# Patient Record
Sex: FEMALE | Race: WHITE
[De-identification: names, ages, dates, MRNs, and addresses within clinical notes are randomized per-mention and may not be internally consistent; named-entity substitution may affect disease eponyms.]

---

## 2018-02-13 ENCOUNTER — HOSPITAL ENCOUNTER (OUTPATIENT)
Dept: HOSPITAL 96 - M.CRD | Age: 73
End: 2018-02-13
Attending: INTERNAL MEDICINE
Payer: MEDICARE

## 2018-02-13 DIAGNOSIS — I08.3: Primary | ICD-10-CM

## 2018-02-13 NOTE — 2DMMODE
[place] [address]
Phone:  [phone] 2 D/M-MODE ECHOCARDIOGRAM     
_______________________________________________________________________________
 
Name:         COGAN,JOAN S                  Room:                     REG CLI
M.R.#:    [id number]     Account #:     [id number]  
Admission:    18    Attend Phys:   Jass Emanuel,
Discharge:                Date of Birth: [birth year]  
Date of Service: 18 1647  Report #:      [id number]
        [id number]
_______________________________________________________________________________
THIS REPORT FOR:  //name//                      
 
 
--------------- APPROVED REPORT --------------
 
 
Study performed:  2018 09:38:49
 
EXAM: Comprehensive 2D, Doppler, and color-flow 
Echocardiogram 
Patient Location: Out-Patient   
      Status:  routine
 
      BSA:         1.63
HR: 66 bpm BP:          168/82 mmHg 
 
Other Information 
Study Quality: Good
 
Indications
Aortic Valve Disease
 
2D Dimensions
   LVEF(%):  51.08 (&gt;50%)
IVSd:  19.31 (7-11mm) LVOT Diam:  20.13 (18-24mm) 
LVDd:  32.64 mm  
PWd:  9.66 (7-11mm) Ascending Ao:  29.03 (22-36mm)
LVDs:  24.40 (25-40mm) 
Aortic Root:  25.34 mm 
   Lopez's LVEF:  51.08 %
 
Volumes
Left Atrial Volume (Systole) 
    LA ESV Index:  25.50 mL/m2
 
Aortic Valve
AoV Peak Reyes.:  3.81 m/s 
AO Peak Gr.:  58.04 mmHg LVOT Max P.02 mmHg
AO Mean Gr.:  35.25 mmHg LVOT Mean P.53 mmHg
    LVOT Max V:  1.12 m/s
AO V2 VTI:  94.41 cm  LVOT Mean V:  0.74 m/s
ANDREA (VTI):  0.86 cm2  LVOT V1 VTI:  25.62 cm
AI [place]:  2.29 m/s2  
AI PHT:  554.14 ms  
 
Mitral Valve
 
 
[place] [address]
Phone:  [phone]                     2 D/M-MODE ECHOCARDIOGRAM     
_______________________________________________________________________________
 
Name:         COGAN,JOAN S                  Room:                     REG CLI
M.R.#:    [id number]     Account #:     [id number]  
Admission:    18    Attend Phys:   Jass Emanuel,
Discharge:                Date of Birth: [birth year]  
Date of Service: 18 1647  Report #:      [id number]
        [id number]
_______________________________________________________________________________
MV Peak Gr.:  7.90 mmHg  
MV Mean Gr.:  3.31 mmHg  E/A Ratio:  0.81
    MV Decel. Time:  296.29 ms
MV E Max Reyes.:  1.03 m/s 
MV PHT:  85.92 ms  
MVA (PHT):  2.56 cm2  
 
TDI
E/Lateral E':  17.17 E/Medial E':  25.75
   Medial E' Reyes.:  0.04 m/s
   Lateral E' Reyes.:  0.06 m/s
 
Pulmonary Valve
PV Peak Reyes.:  1.25 m/s PV Peak Gr.:  6.22 mmHg
 
Tricuspid Valve
TR Peak Gr.:  37.55 mmHg RVSP:  42.55 mmHg
 
Left Ventricle
The left ventricle is normal size. There is normal LV segmental wall 
motion. Moderate concentric left ventricular hypertrophy. Left 
ventricular systolic function is normal. The left ventricular 
ejection fraction is within the normal range. LVEF is 60%. Grade I - 
abnormal relaxation pattern.
 
Right Ventricle
The right ventricle is normal size. The right ventricular systolic 
function is normal.
 
Atria
The left atrium size is normal. The right atrium size is 
normal.
 
Aortic Valve
Aortic valve is moderately calcified. Mild to moderate aortic 
regurgitation. Moderate to severe aortic stenosis.
 
Mitral Valve
Moderate mitral annular calcification. Mild mitral regurgitation. No 
significant mitral valve stenosis.
 
Tricuspid Valve
The tricuspid valve is normal in structure. Mild to moderate 
tricuspid regurgitation. The RVSP is _42.5______ mmHg.
 
Pulmonic Valve
 
 
[place] [address]
Phone:  [phone]                     2 D/M-MODE ECHOCARDIOGRAM     
_______________________________________________________________________________
 
Name:         COGAN,JOAN S                  Room:                     REG CLI
M.R.#:    [id number]     Account #:     [id number]  
Admission:    18    Attend Phys:   Jass Emanuel,
Discharge:                Date of Birth: [birth year]  
Date of Service: 18 1647  Report #:      [id number]
        [id number]
_______________________________________________________________________________
The pulmonary valve is normal in structure. Mild to moderate pulmonic 
regurgitation.
 
Great Vessels
The aortic root is normal in size. IVC is normal in size and 
collapses with &gt;50% inspiration
 
Pericardium
There is no pericardial effusion.
 
&lt;Conclusion&gt;
The left ventricle is normal size.
Moderate concentric left ventricular hypertrophy.
Left ventricular systolic function is normal.
The left ventricular ejection fraction is within the normal 
range.
LVEF is 60%.
Grade I - abnormal relaxation pattern.
The right ventricle is normal size.
The left atrium size is normal.
Aortic valve is moderately calcified.
Mild to moderate aortic regurgitation.
Moderate to severe aortic stenosis.
Moderate mitral annular calcification.
Mild mitral regurgitation.
No significant mitral valve stenosis.
Mild to moderate tricuspid regurgitation.
The RVSP is _42.5______ mmHg.
IVC is normal in size and collapses with &gt;50% inspiration
There is no pericardial effusion.
There is normal LV segmental wall motion.
 
 
 
 
 
 
 
 
 
 
 
 
 
  <ELECTRONICALLY SIGNED>
                                           By: Subhash Pichardo MD, FACC     
  18
D: 18   _____________________________________
T: 18   Subhash Pichardo MD, FACC       /INF

## 2019-02-04 ENCOUNTER — HOSPITAL ENCOUNTER (OUTPATIENT)
Dept: HOSPITAL 96 - M.CRD | Age: 74
End: 2019-02-04
Attending: INTERNAL MEDICINE
Payer: MEDICARE

## 2019-02-04 DIAGNOSIS — I08.0: Primary | ICD-10-CM

## 2019-02-04 NOTE — 2DMMODE
[place] [address]
Phone:  [phone] 2 D/M-MODE ECHOCARDIOGRAM     
_______________________________________________________________________________
 
Name:         COGAN,JOAN S                  Room:                     REG CLI
M.R.#:    [id number]     Account #:     [id number]  
Admission:    19    Attend Phys:   Jass Emanuel,
Discharge:                Date of Birth: [birth year]  
Date of Service: 19 1121  Report #:      [id number]
        [id number]
_______________________________________________________________________________
THIS REPORT FOR:  //name//                      
 
 
--------------- APPROVED REPORT --------------
 
 
Study performed:  2019 08:17:26
 
EXAM: Comprehensive 2D, Doppler, and color-flow Echocardiogram 
Patient Location: Out-Patient   
 
      BSA:         1.66
HR: 63 bpm BP:          130/50 mmHg 
 
Other Information 
Study Quality: Good
 
Indications
Aortic Valve Disease
 
2D Dimensions
IVSd:  14.85 (7-11mm) LVOT Diam:  20.53 (18-24mm) 
LVDd:  43.14 mm  
PWd:  10.18 (7-11mm) Ascending Ao:  28.79 (22-36mm)
LVDs:  26.54 (25-40mm) 
Aortic Root:  24.19 mm 
 
Volumes
Left Atrial Volume (Systole) 
    LA ESV Index:  21.00 mL/m2
 
Aortic Valve
AoV Peak Reyes.:  4.35 m/s 
AO Peak Gr.:  75.63 mmHg LVOT Max PG:  3.99 mmHg
AO Mean Gr.:  47.97 mmHg LVOT Mean P.19 mmHg
    LVOT Max V:  1.00 m/s
AO V2 VTI:  120.08 cm  LVOT Mean V:  0.70 m/s
ANDREA (VTI):  0.70 cm2  LVOT V1 VTI:  25.34 cm
AI [place]:  2.64 m/s2  
AI PHT:  525.65 ms  
 
Mitral Valve
MV Peak Gr.:  6.46 mmHg  
MV Mean Gr.:  3.08 mmHg  E/A Ratio:  0.76
    MV Decel. Time:  453.76 ms
MV E Max Reyes.:  0.89 m/s 
 
 
[place] [address]
Phone:  [phone]                     2 D/M-MODE ECHOCARDIOGRAM     
_______________________________________________________________________________
 
Name:         COGAN,JOAN S                  Room:                     REG CLI
M.R.#:    [id number]     Account #:     [id number]  
Admission:    19    Attend Phys:   Jass Emanuel,
Discharge:                Date of Birth: [birth year]  
Date of Service: 19 1121  Report #:      [id number]
        [id number]
_______________________________________________________________________________
MV PHT:  131.59 ms  
MVA (PHT):  1.67 cm2  
 
TDI
E/Lateral E':  12.71 E/Medial E':  17.80
   Medial E' Reyes.:  0.05 m/s
   Lateral E' Reyes.:  0.07 m/s
 
Pulmonary Valve
PV Peak Reyes.:  1.12 m/s PV Peak Gr.:  4.98 mmHg
 
Tricuspid Valve
    RAP Estimate:  5.00 mmHg
TR Peak Gr.:  27.14 mmHg RVSP:  32.14 mmHg
    PA Pressure:  32.14 mmHg
 
Left Ventricle
The left ventricle is normal size. There is normal LV segmental wall motion.
Moderate 
concentric left ventricular hypertrophy. Left ventricular systolic function is
normal. 
The left ventricular ejection fraction is within the normal range. LVEF is
55-60%. Grade 
I - abnormal relaxation pattern.
 
Right Ventricle
The right ventricle is normal size. The right ventricular systolic function is 
normal.
 
Atria
The left atrium size is normal. The right atrium size is normal.
 
Aortic Valve
Aortic valve is moderately calcified. Mild to moderate aortic regurgitation.
Severe 
aortic stenosis.
 
Mitral Valve
Mild mitral annular calcification. Mild mitral regurgitation. No evidence of
mitral valve 
stenosis.
 
Tricuspid Valve
The tricuspid valve is normal in structure. Mild tricuspid regurgitation.
estimated pa 
pressure 35 mm Hg
 
 
[place] [address]
Phone:  [phone]                     2 D/M-MODE ECHOCARDIOGRAM     
_______________________________________________________________________________
 
Name:         COGAN,JOAN S                  Room:                     REG CLI
M.R.#:    [id number]     Account #:     [id number]  
Admission:    19    Attend Phys:   Jass Emanuel,
Discharge:                Date of Birth: [birth year]  
Date of Service: 19 1121  Report #:      [id number]
        [id number]
_______________________________________________________________________________
 
Pulmonic Valve
The pulmonary valve is normal in structure. Moderate pulmonic regurgitation.
 
Great Vessels
The aortic root is normal in size. IVC is normal in size and collapses >50% with
inspiration.
 
Pericardium
There is no pericardial effusion.
 
<Conclusion>
Moderate concentric left ventricular hypertrophy.
LVEF is 55-60%.
Severe aortic stenosis.
Mild to moderate aortic regurgitation.
Mild mitral regurgitation.
 
 
 
 
 
 
 
 
 
 
 
 
 
 
 
 
 
 
 
 
 
 
 
 
 
 
 
  <ELECTRONICALLY SIGNED>
                                           By: Roosevelt [place] MD, [facility]      
  19     1121
D: 19 112   _____________________________________
T: 19 112   Roosevelt [place] MD, FACC        /INF

## 2019-11-06 ENCOUNTER — HOSPITAL ENCOUNTER (INPATIENT)
Dept: HOSPITAL 96 - M.ERS | Age: 74
LOS: 3 days | Discharge: HOME | DRG: 281 | End: 2019-11-09
Attending: INTERNAL MEDICINE | Admitting: INTERNAL MEDICINE
Payer: MEDICARE

## 2019-11-06 VITALS — SYSTOLIC BLOOD PRESSURE: 143 MMHG | DIASTOLIC BLOOD PRESSURE: 54 MMHG

## 2019-11-06 VITALS — DIASTOLIC BLOOD PRESSURE: 60 MMHG | SYSTOLIC BLOOD PRESSURE: 134 MMHG

## 2019-11-06 VITALS — SYSTOLIC BLOOD PRESSURE: 165 MMHG | DIASTOLIC BLOOD PRESSURE: 95 MMHG

## 2019-11-06 VITALS — WEIGHT: 145.38 LBS | HEIGHT: 60.98 IN | BODY MASS INDEX: 27.45 KG/M2

## 2019-11-06 DIAGNOSIS — I21.4: Primary | ICD-10-CM

## 2019-11-06 DIAGNOSIS — Z82.49: ICD-10-CM

## 2019-11-06 DIAGNOSIS — Z88.1: ICD-10-CM

## 2019-11-06 DIAGNOSIS — I48.91: ICD-10-CM

## 2019-11-06 DIAGNOSIS — Z88.6: ICD-10-CM

## 2019-11-06 DIAGNOSIS — Z88.8: ICD-10-CM

## 2019-11-06 DIAGNOSIS — E78.5: ICD-10-CM

## 2019-11-06 DIAGNOSIS — D68.69: ICD-10-CM

## 2019-11-06 DIAGNOSIS — I05.2: ICD-10-CM

## 2019-11-06 DIAGNOSIS — I10: ICD-10-CM

## 2019-11-06 DIAGNOSIS — Z96.653: ICD-10-CM

## 2019-11-06 DIAGNOSIS — Z79.82: ICD-10-CM

## 2019-11-06 DIAGNOSIS — I25.10: ICD-10-CM

## 2019-11-06 DIAGNOSIS — Z79.899: ICD-10-CM

## 2019-11-06 DIAGNOSIS — Z90.710: ICD-10-CM

## 2019-11-06 DIAGNOSIS — Z88.0: ICD-10-CM

## 2019-11-06 LAB
ABSOLUTE BASOPHILS: 0 THOU/UL (ref 0–0.2)
ABSOLUTE EOSINOPHILS: 0.1 THOU/UL (ref 0–0.7)
ABSOLUTE MONOCYTES: 0.4 THOU/UL (ref 0–1.2)
ALBUMIN SERPL-MCNC: 4 G/DL (ref 3.4–5)
ALP SERPL-CCNC: 67 U/L (ref 46–116)
ALT SERPL-CCNC: 28 U/L (ref 30–65)
ANION GAP SERPL CALC-SCNC: 8 MMOL/L (ref 7–16)
APTT BLD: 28.9 SECONDS (ref 25–31.3)
AST SERPL-CCNC: 18 U/L (ref 15–37)
BASOPHILS NFR BLD AUTO: 0.5 %
BILIRUB SERPL-MCNC: 0.5 MG/DL
BUN SERPL-MCNC: 35 MG/DL (ref 7–18)
CALCIUM SERPL-MCNC: 9.7 MG/DL (ref 8.5–10.1)
CHLORIDE SERPL-SCNC: 101 MMOL/L (ref 98–107)
CK-MB MASS: 3.1 NG/ML
CO2 SERPL-SCNC: 31 MMOL/L (ref 21–32)
CREAT SERPL-MCNC: 0.9 MG/DL (ref 0.6–1.3)
EOSINOPHIL NFR BLD: 1.2 %
GLUCOSE SERPL-MCNC: 183 MG/DL (ref 70–99)
GRANULOCYTES NFR BLD MANUAL: 70.2 %
HCT VFR BLD CALC: 46.6 % (ref 37–47)
HGB BLD-MCNC: 16.1 GM/DL (ref 12–15)
INR PPP: 1
LIPASE: 346 U/L (ref 73–393)
LYMPHOCYTES # BLD: 1.8 THOU/UL (ref 0.8–5.3)
LYMPHOCYTES NFR BLD AUTO: 23.1 %
MAGNESIUM SERPL-MCNC: 2.3 MG/DL (ref 1.8–2.4)
MCH RBC QN AUTO: 30.2 PG (ref 26–34)
MCHC RBC AUTO-ENTMCNC: 34.7 G/DL (ref 28–37)
MCV RBC: 87.1 FL (ref 80–100)
MONOCYTES NFR BLD: 5 %
MPV: 10.5 FL. (ref 7.2–11.1)
NEUTROPHILS # BLD: 5.4 THOU/UL (ref 1.6–8.1)
NT-PRO BRAIN NAT PEPTIDE: 540 PG/ML (ref ?–300)
NUCLEATED RBCS: 0 /100WBC
PLATELET COUNT*: 155 THOU/UL (ref 150–400)
POTASSIUM SERPL-SCNC: 3.5 MMOL/L (ref 3.5–5.1)
PROT SERPL-MCNC: 7.7 G/DL (ref 6.4–8.2)
PROTHROMBIN TIME: 10.7 SECONDS (ref 9.2–11.5)
RBC # BLD AUTO: 5.35 MIL/UL (ref 4.2–5)
RDW-CV: 13 % (ref 10.5–14.5)
SODIUM SERPL-SCNC: 140 MMOL/L (ref 136–145)
WBC # BLD AUTO: 7.8 THOU/UL (ref 4–11)

## 2019-11-07 VITALS — SYSTOLIC BLOOD PRESSURE: 127 MMHG | DIASTOLIC BLOOD PRESSURE: 46 MMHG

## 2019-11-07 VITALS — SYSTOLIC BLOOD PRESSURE: 144 MMHG | DIASTOLIC BLOOD PRESSURE: 50 MMHG

## 2019-11-07 VITALS — DIASTOLIC BLOOD PRESSURE: 61 MMHG | SYSTOLIC BLOOD PRESSURE: 159 MMHG

## 2019-11-07 VITALS — SYSTOLIC BLOOD PRESSURE: 148 MMHG | DIASTOLIC BLOOD PRESSURE: 56 MMHG

## 2019-11-07 VITALS — SYSTOLIC BLOOD PRESSURE: 131 MMHG | DIASTOLIC BLOOD PRESSURE: 45 MMHG

## 2019-11-07 VITALS — SYSTOLIC BLOOD PRESSURE: 130 MMHG | DIASTOLIC BLOOD PRESSURE: 51 MMHG

## 2019-11-07 VITALS — SYSTOLIC BLOOD PRESSURE: 150 MMHG | DIASTOLIC BLOOD PRESSURE: 61 MMHG

## 2019-11-07 LAB
ANION GAP SERPL CALC-SCNC: 9 MMOL/L (ref 7–16)
BUN SERPL-MCNC: 25 MG/DL (ref 7–18)
CALCIUM SERPL-MCNC: 8.8 MG/DL (ref 8.5–10.1)
CHLORIDE SERPL-SCNC: 104 MMOL/L (ref 98–107)
CHOLEST SERPL-MCNC: 202 MG/DL (ref ?–200)
CO2 SERPL-SCNC: 29 MMOL/L (ref 21–32)
CREAT SERPL-MCNC: 0.6 MG/DL (ref 0.6–1.3)
EST. AVERAGE GLUCOSE BLD GHB EST-MCNC: 103 MG/DL
GLUCOSE SERPL-MCNC: 93 MG/DL (ref 70–99)
GLYCOHEMOGLOBIN (HGB A1C): 5.2 % (ref 4.8–5.6)
HCT VFR BLD CALC: 41.5 % (ref 37–47)
HDLC SERPL-MCNC: 52 MG/DL (ref 40–?)
HGB BLD-MCNC: 14.4 GM/DL (ref 12–15)
LDLC SERPL-MCNC: 127 MG/DL (ref ?–100)
MAGNESIUM SERPL-MCNC: 2 MG/DL (ref 1.8–2.4)
MCH RBC QN AUTO: 30.4 PG (ref 26–34)
MCHC RBC AUTO-ENTMCNC: 34.6 G/DL (ref 28–37)
MCV RBC: 87.9 FL (ref 80–100)
MPV: 10.4 FL. (ref 7.2–11.1)
PLATELET COUNT*: 130 THOU/UL (ref 150–400)
POTASSIUM SERPL-SCNC: 3.2 MMOL/L (ref 3.5–5.1)
RBC # BLD AUTO: 4.73 MIL/UL (ref 4.2–5)
RDW-CV: 13.1 % (ref 10.5–14.5)
SERUM ASSESSMENT: CLEAR
SODIUM SERPL-SCNC: 142 MMOL/L (ref 136–145)
TC:HDL: 3.9 RATIO
TRIGL SERPL-MCNC: 117 MG/DL (ref ?–150)
VLDLC SERPL CALC-MCNC: 23 MG/DL (ref ?–40)
WBC # BLD AUTO: 9.3 THOU/UL (ref 4–11)

## 2019-11-07 NOTE — EKG
[place] [address]
Phone:  [phone]                     ELECTROCARDIOGRAM REPORT      
_______________________________________________________________________________
 
Name:       COGAN,JOAN S                   Room:           [place] [address]    ADM [place]  
M.R.#:  [id number]      Account #:      [id number]  
Admission:  19     Attend Phys:    Darrell Raines MD 
Discharge:               Date of Birth:  [birth year]  
         Report #: [id number]
    [id number]
_______________________________________________________________________________
THIS REPORT FOR:  //name//                      
 
                         [facility] ED
                                       
Test Date:    2019               Test Time:    14:43:48
Pat Name:     JOAN COGAN               Department:   
Patient ID:   [id number]            Room:         [facility]
Gender:       F                        Technician:   
:          1945               Requested By: Hardeep Arredondo
Order Number: [id number] MD:   Haim Weller
                                 Measurements
Intervals                              Axis          
Rate:         134                      P:            
MN:                                    QRS:          4
QRSD:         151                      T:            188
QT:           367                                    
QTc:          548                                    
                           Interpretive Statements
Atrial fibrillation, with rapid ventricular response
Left bundle branch block
Compared to ECG 2013 08:50:42
Left bundle-branch block now present
Sinus bradycardia no longer present
 
Electronically Signed On 2019 11:04:10 CST by Haim Weller
[email or web address]
 
 
 
 
 
 
 
 
 
 
 
 
 
 
 
 
 
  <ELECTRONICALLY SIGNED>
                                           By: Haim Weller MD, FACC   
  19     1104
D: 19 1443   _____________________________________
T: 19 1443   Haim Weller MD, FAC     /EPI

## 2019-11-07 NOTE — 2DMMODE
[place] [address]
Phone:  [phone] 2 D/M-MODE ECHOCARDIOGRAM     
_______________________________________________________________________________
 
Name:         COGAN,JOAN S                  Room:          [facility]    ADM IN 
[facility]#:    [id number]     Account #:     [id number]  
Admission:    19    Attend Phys:   Darrell Raines, 
Discharge:                Date of Birth: [birth year]  
Date of Service: 19 1119  Report #:      [id number]
        [id number]
_______________________________________________________________________________
THIS REPORT FOR:  //name//                      
 
 
--------------- APPROVED REPORT --------------
 
 
Study performed:  2019 10:43:36
 
EXAM: Comprehensive 2D, Doppler, and color-flow 
Echocardiogram 
Patient Location: In-Patient   
Room #:  [facility]     Status:  routine
 
      BSA:         1.65
HR: 59 bpm BP:          130/51 mmHg 
Rhythm: NSR     
 
Other Information 
Study Quality: Good
 
Indications
Atrial Fibrillation
Chest Pain
 
2D Dimensions
IVSd:  17.79 (7-11mm) LVOT Diam:  19.73 (18-24mm) 
LVDd:  38.24 mm  
PWd:  13.03 (7-11mm) Ascending Ao:  32.55 (22-36mm)
LVDs:  26.12 (25-40mm) 
Aortic Root:  26.73 mm 
 
Volumes
Left Atrial Volume (Systole) 
    LA ESV Index:  48.40 mL/m2
 
Aortic Valve
AoV Peak Reyes.:  4.33 m/s 
AO Peak Gr.:  75.16 mmHg LVOT Max P.99 mmHg
AO Mean Gr.:  49.92 mmHg LVOT Mean P.63 mmHg
    LVOT Max V:  0.86 m/s
AO V2 VTI:  119.99 cm  LVOT Mean V:  0.59 m/s
ANDREA (VTI):  0.60 cm2  LVOT V1 VTI:  23.55 cm
AI [place]:  1.92 m/s2  
AI PHT:  612.33 ms  
 
Mitral Valve
 
 
[place] [address]
Phone:  [phone]                     2 D/M-MODE ECHOCARDIOGRAM     
_______________________________________________________________________________
 
Name:         COGAN,JOAN S                  Room:          [address] [place] 
.R.#:    [id number]     Account #:     [id number]  
Admission:    19    Attend Phys:   Darrell Raines, 
Discharge:                Date of Birth: [birth year]  
Date of Service: 19 1119  Report #:      [id number]
        [id number]
_______________________________________________________________________________
MV Mean Gr.:  2.40 mmHg  E/A Ratio:  0.73
    MV Decel. Time:  351.98 ms
MV E Max Reyes.:  0.99 m/s 
MV PHT:  102.07 ms  
MVA (PHT):  2.16 cm2  
 
TDI
E/Lateral E':  14.14 E/Medial E':  24.75
   Medial E' Reyes.:  0.04 m/s
   Lateral E' Reyes.:  0.07 m/s
 
Pulmonary Valve
PV Peak Reyes.:  1.11 m/s PV Peak Gr.:  4.92 mmHg
 
Tricuspid Valve
    RAP Estimate:  5.00 mmHg
TR Peak Gr.:  35.61 mmHg RVSP:  40.00 mmHg
    PA Pressure:  40.00 mmHg
 
Left Ventricle
The left ventricle is normal size. There is normal LV segmental wall 
motion. Moderate concentric left ventricular hypertrophy. Left 
ventricular systolic function is normal. LVEF is 60-65%. Grade I - 
abnormal relaxation pattern.
 
Right Ventricle
The right ventricle is normal size. The right ventricular systolic 
function is normal.
 
Atria
Left atrium is mildly dilated. The right atrium size is 
normal.
 
Aortic Valve
Severe aortic valve sclerosis. Mild aortic regurgitation. Severe 
aortic stenosis.
 
Mitral Valve
There is mitral annular calcification. Mild mitral regurgitation. No 
evidence of mitral valve stenosis.
 
Tricuspid Valve
The tricuspid valve is normal in structure. Mild tricuspid 
regurgitation. The RVSP is 45-50 mmHg.
 
Pulmonic Valve
 
 
[place] [address]
Phone:  [phone]                     2 D/M-MODE ECHOCARDIOGRAM     
_______________________________________________________________________________
 
Name:         COGANBLOSSOM S                  Room:          [address] [place] 
[facility]#:    [id number]     Account #:     [id number]  
Admission:    19    Attend Phys:   Darrell Raines, 
Discharge:                Date of Birth: [birth year]  
Date of Service: 19 1119  Report #:      [id number]
        [id number]
_______________________________________________________________________________
The pulmonary valve is normal in structure. Mild pulmonic 
regurgitation.
 
Great Vessels
The aortic root is normal in size. IVC is normal in size and 
collapses >50% with inspiration.
 
Pericardium
There is no pericardial effusion.
 
<Conclusion>
The left ventricle is normal size.
Moderate concentric left ventricular hypertrophy.
Left ventricular systolic function is normal.
LVEF is 60-65%.
Grade I - abnormal relaxation pattern.
Left atrium is mildly dilated.
Severe aortic valve sclerosis.
Mild aortic regurgitation.
Severe aortic stenosis.
Mild mitral regurgitation.
Mild tricuspid regurgitation.
The RVSP is 45-50 mmHg.
Mild pulmonic regurgitation.
 
 
 
 
 
 
 
 
 
 
 
 
 
 
 
 
 
 
 
 
  <ELECTRONICALLY SIGNED>
                                           By: Haim Weller MD, FACC   
  19     1119
D: 19   _____________________________________
T: 19   Haim Weller MD, FACC     /INF

## 2019-11-07 NOTE — CON
[address]  [address]                    CONSULTATION                  
_______________________________________________________________________________
 
Name:       COGAN,JOAN S                   Room:           [place] [address]    ADM [place]  
.R.#:  [id number]      Account #:      [id number]  
Admission:  11/06/19     Attend Phys:    Darrell Raines MD 
Discharge:               Date of Birth:  [birth year]  
         Report #: [id number]
                                                                     [id number]  
_______________________________________________________________________________
THIS REPORT FOR:  //name//                      
 
CC: GILBERTO Pichardo MD [facility]
    Gilberto Crews
 
INDICATION:  New onset atrial fibrillation and non-ST elevation myocardial
infarction.
 
HISTORY OF PRESENT ILLNESS:  The patient is a pleasant 74-year-old female with
known history of severe aortic stenosis that has been asymptomatic.  She had
onset of midsternal chest discomfort yesterday afternoon at approximately 4
o'clock and presented to the Emergency Room for further evaluation.  She was
found to be in atrial fibrillation with a rapid ventricular response rate. 
Retrospectively, she believes these symptoms of palpitations had been going on
for a couple of days.  The patient was placed on a diltiazem drip and promptly
converted to sinus rhythm.  She had transient hypotension with this.  Her blood
pressure has recovered.  She is presently asymptomatic.  In this setting, she
did have an elevation of troponin to approximately 11.  Initial EKG shows atrial
fibrillation with a rapid ventricular response rate and left bundle-branch
block.  Subsequent EKG shows sinus rhythm with left bundle branch block.
 
CARDIAC RISK FACTORS:  Include hypertension, dyslipidemia, family history of
coronary artery disease and history of diabetes, presently not on medication. 
She is not a smoker.
 
PAST MEDICAL HISTORY:
1.  Severe aortic stenosis.
2.  Hypertension.
3.  Dyslipidemia.
4.  Diet controlled diabetes.
5.  Family history of premature atherosclerotic coronary artery disease.
6.  Hysterectomy.
7.  Bilateral knee replacements.
 
HOME MEDICATIONS:  Hydrochlorothiazide 25 mg daily.
 
ALLERGIES:  AMOXICILLIN, CODEINE, NITROFURANTOIN, PENICILLIN.
 
SOCIAL HISTORY:  The patient is .  She does not smoke.  She does not
drink alcohol.
 
FAMILY HISTORY:  Positive for premature atherosclerotic coronary artery disease.
 
PHYSICAL EXAMINATION:
 
 
 
[place] [address]                    CONSULTATION                  
_______________________________________________________________________________
 
Name:       COGAN,JOAN S                   Room:           [address]#:  [id number]      Account #:      [id number]  
Admission:  11/06/19     Attend Phys:    Darrell Raines MD 
Discharge:               Date of Birth:  [birth year]  
         Report #: [id number]
                                                                     [id number]  
_______________________________________________________________________________
VITAL SIGNS:  Blood pressure 130/51, pulse 51 and regular.
GENERAL:  This is a pleasant lady in no distress.  Mood and affect appropriate.
HEENT:  The patient is wearing glasses.  Extraocular muscles intact.  Mucous
membranes are moist.
NECK:  Shows no jugular venous distention.  There is a cardiac murmur that
radiates to the carotids bilaterally.
CHEST:  Clear to auscultation.
CARDIOVASCULAR:  Reveals a regular rhythm with a grade 3/6 systolic ejection
murmur heard best at the right upper sternal border.  I do not appreciate a
gallop.
ABDOMEN:  Reveals normal bowel sounds.  The abdomen is soft, nontender.
EXTREMITIES:  Shows no edema.  Peripheral pulses are 2+ and palpable.
SKIN:  Warm and dry.
 
LABORATORY DATA:  Reviewed.  Sodium 142, potassium 3.2, chloride 104,
bicarbonate 29, BUN 25, creatinine 0.6, serum glucose 93.  LFTs are within
normal limits.  Troponin thus far 11.06.  NT-proBNP 540, cholesterol 202,
triglycerides 117, HDL 52, .  Hemoglobin A1c 5.2.  White blood cell count
9.3, hemoglobin 14.4, platelet count 130,000.  Chest x-ray shows no acute
cardiopulmonary process.
 
IMPRESSION AND RECOMMENDATIONS:
1.  Atrial fibrillation with rapid ventricular response rate presently in sinus
rhythm.  She is on a heparin drip presently.  She is on diltiazem.  We will
continue current treatment for now.  She will likely need long-term
anticoagulation.
2.  Non-ST elevation myocardial infarction.  Recommend coronary angiography and
possible intervention.
3.  Hypertension.  Blood pressure adequately controlled presently.  We will
follow and make adjustments to her medications as needed.
4.  Dyslipidemia.  Recommend atorvastatin 40 mg daily.
5.  Hypercoagulable state.  Secondary to atrial fibrillation.
 
 
 
 
 
 
 
 
 
 
 
 
<ELECTRONICALLY SIGNED>
                                        By:  Haim Weller MD, FACC   
11/07/19     1350
D: 11/07/19 1033_______________________________________
T: 11/07/19 1047Micharesh Weller MD, FACC      /nt

## 2019-11-07 NOTE — EKG
[place] [address]
Phone:  [phone]                     ELECTROCARDIOGRAM REPORT      
_______________________________________________________________________________
 
Name:       COGAN,JOAN S                   Room:           [place] [address]    ADM IN  
.R.#:  [id number]      Account #:      [id number]  
Admission:  19     Attend Phys:    Darrell Raines MD 
Discharge:               Date of Birth:  [birth year]  
         Report #: [id number]
    [id number]
_______________________________________________________________________________
THIS REPORT FOR:  //name//                      
 
                         [facility] ED
                                       
Test Date:    2019               Test Time:    15:33:26
Pat Name:     JOAN COGAN               Department:   
Patient ID:   [id number]            Room:         [facility]
Gender:       F                        Technician:   
:          1945               Requested By: Hardeep Arredondo
Order Number: [id number] MD:   Haim Weller
                                 Measurements
Intervals                              Axis          
Rate:         70                       P:            41
ME:           170                      QRS:          6
QRSD:         166                      T:            180
QT:           468                                    
QTc:          506                                    
                           Interpretive Statements
Sinus rhythm
Probable left atrial enlargement
Left bundle branch block
Baseline wander in lead(s) V3
Compared to ECG 2013 08:50:42
Left bundle-branch block now present
Sinus bradycardia no longer present
 
Electronically Signed On 2019 11:05:55 CST by Haim Weller
[email or web address]
 
 
 
 
 
 
 
 
 
 
 
 
 
 
 
  <ELECTRONICALLY SIGNED>
                                           By: Haim Weller MD, FACC   
  19     1105
D: 19 1533   _____________________________________
T: 19 1533   Haim Weller MD, FAC     /EPI

## 2019-11-08 VITALS — SYSTOLIC BLOOD PRESSURE: 142 MMHG | DIASTOLIC BLOOD PRESSURE: 57 MMHG

## 2019-11-08 VITALS — DIASTOLIC BLOOD PRESSURE: 78 MMHG | SYSTOLIC BLOOD PRESSURE: 146 MMHG

## 2019-11-08 VITALS — DIASTOLIC BLOOD PRESSURE: 91 MMHG | SYSTOLIC BLOOD PRESSURE: 137 MMHG

## 2019-11-08 VITALS — SYSTOLIC BLOOD PRESSURE: 149 MMHG | DIASTOLIC BLOOD PRESSURE: 60 MMHG

## 2019-11-08 VITALS — DIASTOLIC BLOOD PRESSURE: 56 MMHG | SYSTOLIC BLOOD PRESSURE: 145 MMHG

## 2019-11-08 VITALS — SYSTOLIC BLOOD PRESSURE: 140 MMHG | DIASTOLIC BLOOD PRESSURE: 69 MMHG

## 2019-11-08 VITALS — SYSTOLIC BLOOD PRESSURE: 135 MMHG | DIASTOLIC BLOOD PRESSURE: 54 MMHG

## 2019-11-08 VITALS — SYSTOLIC BLOOD PRESSURE: 151 MMHG | DIASTOLIC BLOOD PRESSURE: 61 MMHG

## 2019-11-08 VITALS — SYSTOLIC BLOOD PRESSURE: 140 MMHG | DIASTOLIC BLOOD PRESSURE: 95 MMHG

## 2019-11-08 VITALS — DIASTOLIC BLOOD PRESSURE: 95 MMHG | SYSTOLIC BLOOD PRESSURE: 164 MMHG

## 2019-11-08 VITALS — SYSTOLIC BLOOD PRESSURE: 137 MMHG | DIASTOLIC BLOOD PRESSURE: 91 MMHG

## 2019-11-08 VITALS — SYSTOLIC BLOOD PRESSURE: 140 MMHG | DIASTOLIC BLOOD PRESSURE: 65 MMHG

## 2019-11-08 VITALS — SYSTOLIC BLOOD PRESSURE: 139 MMHG | DIASTOLIC BLOOD PRESSURE: 54 MMHG

## 2019-11-08 VITALS — DIASTOLIC BLOOD PRESSURE: 43 MMHG | SYSTOLIC BLOOD PRESSURE: 141 MMHG

## 2019-11-08 LAB
ALBUMIN SERPL-MCNC: 3.3 G/DL (ref 3.4–5)
ALP SERPL-CCNC: 42 U/L (ref 46–116)
ALT SERPL-CCNC: 25 U/L (ref 30–65)
ANION GAP SERPL CALC-SCNC: 9 MMOL/L (ref 7–16)
AST SERPL-CCNC: 35 U/L (ref 15–37)
BILIRUB SERPL-MCNC: 0.8 MG/DL
BUN SERPL-MCNC: 14 MG/DL (ref 7–18)
CALCIUM SERPL-MCNC: 8.7 MG/DL (ref 8.5–10.1)
CHLORIDE SERPL-SCNC: 107 MMOL/L (ref 98–107)
CO2 SERPL-SCNC: 26 MMOL/L (ref 21–32)
CREAT SERPL-MCNC: 0.6 MG/DL (ref 0.6–1.3)
GLUCOSE SERPL-MCNC: 83 MG/DL (ref 70–99)
HCT VFR BLD CALC: 41.7 % (ref 37–47)
HGB BLD-MCNC: 14.2 GM/DL (ref 12–15)
MCH RBC QN AUTO: 30.2 PG (ref 26–34)
MCHC RBC AUTO-ENTMCNC: 34.2 G/DL (ref 28–37)
MCV RBC: 88.5 FL (ref 80–100)
MPV: 10.7 FL. (ref 7.2–11.1)
PLATELET COUNT*: 113 THOU/UL (ref 150–400)
POTASSIUM SERPL-SCNC: 3.9 MMOL/L (ref 3.5–5.1)
PROT SERPL-MCNC: 6.3 G/DL (ref 6.4–8.2)
RBC # BLD AUTO: 4.71 MIL/UL (ref 4.2–5)
RDW-CV: 13.2 % (ref 10.5–14.5)
SODIUM SERPL-SCNC: 142 MMOL/L (ref 136–145)
WBC # BLD AUTO: 5.2 THOU/UL (ref 4–11)

## 2019-11-08 PROCEDURE — 4A023N7 MEASUREMENT OF CARDIAC SAMPLING AND PRESSURE, LEFT HEART, PERCUTANEOUS APPROACH: ICD-10-PCS | Performed by: INTERNAL MEDICINE

## 2019-11-08 PROCEDURE — B211YZZ FLUOROSCOPY OF MULTIPLE CORONARY ARTERIES USING OTHER CONTRAST: ICD-10-PCS | Performed by: INTERNAL MEDICINE

## 2019-11-08 PROCEDURE — B41FYZZ FLUOROSCOPY OF RIGHT LOWER EXTREMITY ARTERIES USING OTHER CONTRAST: ICD-10-PCS | Performed by: INTERNAL MEDICINE

## 2019-11-08 NOTE — CARD
[address]  [address]                    CARDIAC CATH REPORT           
_______________________________________________________________________________
 
Name:       COGAN,JOAN S                   Room:           [address] [place]  
[facility]#:  [id number]      Account #:      [id number]  
Admission:  11/06/19     Attend Phys:    Darrell Raines MD 
Discharge:               Date of Birth:  [birth year]  
         Report #: [id number]
                                                                     [id number]
_______________________________________________________________________________
THIS REPORT FOR:  //name//                      
 
 
--------------- APPROVED REPORT --------------
 
 
Study performed:  11/08/2019 11:03:53
 
Patient Details
Patient Status: In-Patient                  Room #: 
The patient is a 74 year-old female
 
Event Personnel
Subhash Pichardo  Cardiologist, Gloria Toribio RN Circulator, Aaron Mcpherson RTR Scrub, Rajan Yoder RCMORE Scrub, Candida Hodges RTR 
Monitor
 
Procedures Performed
Coronary Angiography Only [id number] CORANG , 
 
Indication
Non-STEMI 
 
Risk Factors
Hypercholesterolemia
 
Procedure Narrative
The patient was brought electively to the Cardiac Catheterization 
Laboratory and was prepped and draped in a sterile manner. The right 
femoral was infiltrated with 2% Lidocaine subcutaneous anesthesia. A 
Whitehouse 6 FR sheath was inserted into the . Coronary angiography was 
performed using coronary diagnostic catheters. The right coronary 
system was accessed and visualized with a AL1 5fr catheter. The left 
coronary system was accessed and visualized with a JL 3.5 6fr 
catheter. Pre-demployment femoral angiogram was performed . 
Hemostasis was obtained with manual pressure following sheath removal 
without any complications. The patient tolerated the procedure well 
and there were no complications associated with the procedure. There 
was no hematoma.
 
Intraoperative Conscious Sedation
Fentanyl  50 mcg   
 
Fluoro Time:    7.5 minutes    
Dose:     DAP 72142 cGycm2  674 mGy  
Contrast Type and Amount:  Visipaque 110 ml   
 
 
 
[place] [address]                    CARDIAC CATH REPORT           
_______________________________________________________________________________
 
Name:       COGAN,JOAN S                   Room:           [address]#:  [id number]      Account #:      [id number]  
Admission:  11/06/19     Attend Phys:    Darrell Raines MD 
Discharge:               Date of Birth:  [birth year]  
         Report #: [id number]
                                                                     [id number]
_______________________________________________________________________________
 
Coronary Angiography
The patient's coronary anatomy is right dominant. 
 
Diagnostic Cath
Left Main 0% narrowing
LAD  80% tubular mid LAD stenosis with 80% first diagonal 
stenosis
Circumflex 75% stenosis of the midportion of the first marginal 
branch of the nondominant circumflex
Right Coronary Aberrant takeoff with the takeoff from the left 
coronary cusp with 50% mid vessel stenosis 40% distal narrowing and 
90% stenosis of the prominent posterolateral branch of the distal 
right coronary artery
 
Left Ventriculography
Left Ventriculography was not performed.     
 
Hemodynamics
The aortic pressure is 139/51 mmHg with a mean of 81 mmHg. 
 
Conclusion
#1 significant multivessel coronary artery disease characterized by 
the following:
 
A 80% tubular mid LAD stenosis with 80% first diagonal stenosis
 
B 75% stenosis of the midportion of the first marginal branch of the 
nondominant circumflex
 
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 
coronary cusp with 50% mid vessel stenosis 40% distal narrowing and 
90% posterolateral branch stenosis
 
#2 normal systemic pressure throughout the study
 
#3 severe aortic stenosis documented by echocardiographic evaluation. 
 
Recommendations
Valve Surgery
CABG
 
 
 
 
[place] [address]                    CARDIAC CATH REPORT           
_______________________________________________________________________________
 
Name:       COGAN,JOAN S                   Room:           [address] [place]  
.R.#:  [id number]      Account #:      [id number]  
Admission:  11/06/19     Attend Phys:    Darrell Raines MD 
Discharge:               Date of Birth:  [birth year]  
         Report #: [id number]
                                                                     [id number]
_______________________________________________________________________________
Diagnostic Cath Approved by: Subhash Pichardo MD        Date/Time: 
11/08/2019 13:31:33
 
 
 
 
 
 
 
 
 
 
 
 
 
 
 
 
 
 
 
 
 
 
 
 
 
 
 
 
 
 
 
 
 
 
 
 
 
 
 
 
 
 
<ELECTRONICALLY SIGNED>
                                        By:  Subhash Pichardo MD, [facility]     
11/08/19     1331
D: 11/08/19 1331_______________________________________
T: 11/08/19 1331Subhash Pichardo MD, [facility]        /INF

## 2019-11-09 VITALS — DIASTOLIC BLOOD PRESSURE: 59 MMHG | SYSTOLIC BLOOD PRESSURE: 169 MMHG

## 2019-11-09 VITALS — DIASTOLIC BLOOD PRESSURE: 62 MMHG | SYSTOLIC BLOOD PRESSURE: 149 MMHG

## 2019-11-09 VITALS — DIASTOLIC BLOOD PRESSURE: 49 MMHG | SYSTOLIC BLOOD PRESSURE: 174 MMHG

## 2019-11-09 VITALS — SYSTOLIC BLOOD PRESSURE: 173 MMHG | DIASTOLIC BLOOD PRESSURE: 54 MMHG

## 2019-11-09 LAB
ANION GAP SERPL CALC-SCNC: 8 MMOL/L (ref 7–16)
BUN SERPL-MCNC: 15 MG/DL (ref 7–18)
CALCIUM SERPL-MCNC: 8.4 MG/DL (ref 8.5–10.1)
CHLORIDE SERPL-SCNC: 108 MMOL/L (ref 98–107)
CO2 SERPL-SCNC: 26 MMOL/L (ref 21–32)
CREAT SERPL-MCNC: 0.6 MG/DL (ref 0.6–1.3)
GLUCOSE SERPL-MCNC: 77 MG/DL (ref 70–99)
HCT VFR BLD CALC: 41.6 % (ref 37–47)
HGB BLD-MCNC: 13.8 GM/DL (ref 12–15)
MAGNESIUM SERPL-MCNC: 2 MG/DL (ref 1.8–2.4)
MCH RBC QN AUTO: 29.7 PG (ref 26–34)
MCHC RBC AUTO-ENTMCNC: 33.3 G/DL (ref 28–37)
MCV RBC: 89.2 FL (ref 80–100)
MPV: 10.7 FL. (ref 7.2–11.1)
PLATELET COUNT*: 114 THOU/UL (ref 150–400)
POTASSIUM SERPL-SCNC: 3.7 MMOL/L (ref 3.5–5.1)
RBC # BLD AUTO: 4.66 MIL/UL (ref 4.2–5)
RDW-CV: 13.1 % (ref 10.5–14.5)
SODIUM SERPL-SCNC: 142 MMOL/L (ref 136–145)
WBC # BLD AUTO: 6.4 THOU/UL (ref 4–11)

## 2019-11-21 ENCOUNTER — HOSPITAL ENCOUNTER (OUTPATIENT)
Dept: HOSPITAL 35 - ULTRA | Age: 74
End: 2019-11-21
Attending: THORACIC SURGERY (CARDIOTHORACIC VASCULAR SURGERY)
Payer: COMMERCIAL

## 2019-11-21 DIAGNOSIS — Z88.2: ICD-10-CM

## 2019-11-21 DIAGNOSIS — Z88.0: ICD-10-CM

## 2019-11-21 DIAGNOSIS — I35.9: ICD-10-CM

## 2019-11-21 DIAGNOSIS — I65.23: Primary | ICD-10-CM

## 2019-11-21 DIAGNOSIS — I25.119: ICD-10-CM

## 2019-11-21 LAB
ALBUMIN SERPL-MCNC: 4.2 G/DL (ref 3.4–5)
ALT SERPL-CCNC: 38 U/L (ref 30–65)
ANION GAP SERPL CALC-SCNC: 5 MMOL/L (ref 7–16)
APTT BLD: 31 SECONDS (ref 24.5–32.8)
AST SERPL-CCNC: 30 U/L (ref 15–37)
BASOPHILS NFR BLD AUTO: 0.4 % (ref 0–2)
BILIRUB SERPL-MCNC: 1.2 MG/DL
BILIRUB UR-MCNC: NEGATIVE MG/DL
BUN SERPL-MCNC: 32 MG/DL (ref 7–18)
CALCIUM SERPL-MCNC: 9.9 MG/DL (ref 8.5–10.1)
CHLORIDE SERPL-SCNC: 100 MMOL/L (ref 98–107)
CO2 SERPL-SCNC: 34 MMOL/L (ref 21–32)
COLOR UR: YELLOW
CREAT SERPL-MCNC: 0.6 MG/DL (ref 0.6–1)
EOSINOPHIL NFR BLD: 1.1 % (ref 0–3)
ERYTHROCYTE [DISTWIDTH] IN BLOOD BY AUTOMATED COUNT: 12.7 % (ref 10.5–14.5)
EST. AVERAGE GLUCOSE BLD GHB EST-MCNC: 103 MG/DL
GLUCOSE SERPL-MCNC: 92 MG/DL (ref 74–106)
GLYCOHEMOGLOBIN (HGB A1C): 5.2 % (ref 4.8–5.6)
GRANULOCYTES NFR BLD MANUAL: 69.1 % (ref 36–66)
HCT VFR BLD CALC: 46.6 % (ref 37–47)
HGB BLD-MCNC: 15.6 GM/DL (ref 12–15)
INR PPP: 1.1
KETONES UR STRIP-MCNC: NEGATIVE MG/DL
LG PLATELETS BLD QL SMEAR: (no result)
LYMPHOCYTES NFR BLD AUTO: 23.3 % (ref 24–44)
MCH RBC QN AUTO: 29.7 PG (ref 26–34)
MCHC RBC AUTO-ENTMCNC: 33.4 G/DL (ref 28–37)
MCV RBC: 88.9 FL (ref 80–100)
MONOCYTES NFR BLD: 6.1 % (ref 1–8)
NEUTROPHILS # BLD: 5.8 THOU/UL (ref 1.4–8.2)
PLATELET # BLD: 150 THOU/UL (ref 150–400)
POTASSIUM SERPL-SCNC: 3.7 MMOL/L (ref 3.5–5.1)
PROT SERPL-MCNC: 7.3 G/DL (ref 6.4–8.2)
PROTHROMBIN TIME: 11.4 SECONDS (ref 9.3–11.4)
RBC # BLD AUTO: 5.24 MIL/UL (ref 4.2–5)
RBC # UR STRIP: NEGATIVE /UL
SODIUM SERPL-SCNC: 139 MMOL/L (ref 136–145)
SP GR UR STRIP: 1.02 (ref 1–1.03)
URINE CLARITY: CLEAR
URINE GLUCOSE-RANDOM*: NEGATIVE
URINE LEUKOCYTES-REFLEX: NEGATIVE
URINE NITRITE-REFLEX: NEGATIVE
URINE PROTEIN (DIPSTICK): NEGATIVE
UROBILINOGEN UR STRIP-ACNC: 0.2 E.U./DL (ref 0.2–1)
WBC # BLD AUTO: 8.5 THOU/UL (ref 4–11)

## 2019-12-03 ENCOUNTER — HOSPITAL ENCOUNTER (INPATIENT)
Dept: HOSPITAL 35 - PRE | Age: 74
LOS: 10 days | Discharge: NURSING, REHAB OR LONG-TERM CARE | DRG: 219 | End: 2019-12-13
Attending: THORACIC SURGERY (CARDIOTHORACIC VASCULAR SURGERY) | Admitting: THORACIC SURGERY (CARDIOTHORACIC VASCULAR SURGERY)
Payer: COMMERCIAL

## 2019-12-03 VITALS — WEIGHT: 157.9 LBS | HEIGHT: 62 IN | BODY MASS INDEX: 29.06 KG/M2

## 2019-12-03 VITALS — DIASTOLIC BLOOD PRESSURE: 26 MMHG | SYSTOLIC BLOOD PRESSURE: 92 MMHG

## 2019-12-03 VITALS — SYSTOLIC BLOOD PRESSURE: 78 MMHG | DIASTOLIC BLOOD PRESSURE: 18 MMHG

## 2019-12-03 VITALS — SYSTOLIC BLOOD PRESSURE: 117 MMHG | DIASTOLIC BLOOD PRESSURE: 38 MMHG

## 2019-12-03 VITALS — SYSTOLIC BLOOD PRESSURE: 115 MMHG | DIASTOLIC BLOOD PRESSURE: 33 MMHG

## 2019-12-03 VITALS — DIASTOLIC BLOOD PRESSURE: 22 MMHG | SYSTOLIC BLOOD PRESSURE: 84 MMHG

## 2019-12-03 VITALS — SYSTOLIC BLOOD PRESSURE: 91 MMHG | DIASTOLIC BLOOD PRESSURE: 30 MMHG

## 2019-12-03 VITALS — DIASTOLIC BLOOD PRESSURE: 43 MMHG | SYSTOLIC BLOOD PRESSURE: 152 MMHG

## 2019-12-03 DIAGNOSIS — Z79.899: ICD-10-CM

## 2019-12-03 DIAGNOSIS — I97.190: ICD-10-CM

## 2019-12-03 DIAGNOSIS — Z88.5: ICD-10-CM

## 2019-12-03 DIAGNOSIS — E43: ICD-10-CM

## 2019-12-03 DIAGNOSIS — Z88.1: ICD-10-CM

## 2019-12-03 DIAGNOSIS — E87.0: ICD-10-CM

## 2019-12-03 DIAGNOSIS — I25.10: Primary | ICD-10-CM

## 2019-12-03 DIAGNOSIS — D72.829: ICD-10-CM

## 2019-12-03 DIAGNOSIS — D62: ICD-10-CM

## 2019-12-03 DIAGNOSIS — E87.6: ICD-10-CM

## 2019-12-03 DIAGNOSIS — Z90.710: ICD-10-CM

## 2019-12-03 DIAGNOSIS — J90: ICD-10-CM

## 2019-12-03 DIAGNOSIS — Z90.49: ICD-10-CM

## 2019-12-03 DIAGNOSIS — Z88.8: ICD-10-CM

## 2019-12-03 DIAGNOSIS — Z96.659: ICD-10-CM

## 2019-12-03 DIAGNOSIS — Z82.49: ICD-10-CM

## 2019-12-03 DIAGNOSIS — I35.0: ICD-10-CM

## 2019-12-03 DIAGNOSIS — I48.0: ICD-10-CM

## 2019-12-03 DIAGNOSIS — I49.5: ICD-10-CM

## 2019-12-03 DIAGNOSIS — Z88.2: ICD-10-CM

## 2019-12-03 DIAGNOSIS — I10: ICD-10-CM

## 2019-12-03 DIAGNOSIS — D69.6: ICD-10-CM

## 2019-12-03 DIAGNOSIS — Z79.01: ICD-10-CM

## 2019-12-03 LAB
ANION GAP SERPL CALC-SCNC: 14 MMOL/L (ref 7–16)
ANION GAP SERPL CALC-SCNC: 8 MMOL/L (ref 7–16)
APTT BLD: 34.8 SECONDS (ref 24.5–32.8)
APTT BLD: 36.2 SECONDS (ref 24.5–32.8)
APTT BLD: 44.8 SECONDS (ref 24.5–32.8)
BASE EXCESS STD BLDA CALC-SCNC: 2 MMOL/L
BASE EXCESS STD BLDA CALC-SCNC: 4 MMOL/L
BASE EXCESS STD BLDA CALC-SCNC: 5 MMOL/L
BASE EXCESS STD BLDA CALC-SCNC: 5 MMOL/L
BE(VIVO): -5.4 MMOL/L
BE(VIVO): -6.5 MMOL/L
BE(VIVO): -9.1 MMOL/L
BUN SERPL-MCNC: 15 MG/DL (ref 7–18)
BUN SERPL-MCNC: 15 MG/DL (ref 7–18)
CALCIUM SERPL-MCNC: 7.9 MG/DL (ref 8.5–10.1)
CALCIUM SERPL-MCNC: 7.9 MG/DL (ref 8.5–10.1)
CHLORIDE SERPL-SCNC: 109 MMOL/L (ref 98–107)
CHLORIDE SERPL-SCNC: 110 MMOL/L (ref 98–107)
CO2 SERPL-SCNC: 20 MMOL/L (ref 21–32)
CO2 SERPL-SCNC: 25 MMOL/L (ref 21–32)
CREAT SERPL-MCNC: 0.6 MG/DL (ref 0.6–1)
CREAT SERPL-MCNC: 0.8 MG/DL (ref 0.6–1)
ERYTHROCYTE [DISTWIDTH] IN BLOOD BY AUTOMATED COUNT: 13.3 % (ref 10.5–14.5)
ERYTHROCYTE [DISTWIDTH] IN BLOOD BY AUTOMATED COUNT: 13.5 % (ref 10.5–14.5)
ERYTHROCYTE [DISTWIDTH] IN BLOOD BY AUTOMATED COUNT: 13.8 % (ref 10.5–14.5)
FIBRINOGEN PPP-MCNC: 121.8 MG/DL (ref 210–360)
FIBRINOGEN PPP-MCNC: 133.3 MG/DL (ref 210–360)
GLUCOSE BLD-MCNC: 113 MG/DL (ref 70–99)
GLUCOSE BLD-MCNC: 131 MG/DL (ref 70–99)
GLUCOSE BLD-MCNC: 172 MG/DL (ref 70–99)
GLUCOSE BLD-MCNC: 96 MG/DL (ref 70–99)
GLUCOSE SERPL-MCNC: 150 MG/DL (ref 74–106)
GLUCOSE SERPL-MCNC: 98 MG/DL (ref 74–106)
HCO3 BLD-SCNC: 17.1 MMOL/L (ref 22–26)
HCO3 BLD-SCNC: 17.2 MMOL/L (ref 22–26)
HCO3 BLD-SCNC: 19.9 MMOL/L (ref 22–26)
HCO3 BLDA-SCNC: 25.2 MMOL/L (ref 22–26)
HCO3 BLDA-SCNC: 27.3 MMOL/L (ref 22–26)
HCO3 BLDA-SCNC: 28.5 MMOL/L (ref 22–26)
HCO3 BLDA-SCNC: 28.7 MMOL/L (ref 22–26)
HCT VFR BLD AUTO: 21 % (ref 37–47)
HCT VFR BLD AUTO: 21 % (ref 37–47)
HCT VFR BLD AUTO: 22 % (ref 37–47)
HCT VFR BLD AUTO: 25 % (ref 37–47)
HCT VFR BLD CALC: 22.9 % (ref 37–47)
HCT VFR BLD CALC: 24.4 % (ref 37–47)
HCT VFR BLD CALC: 28.1 % (ref 37–47)
HGB BLD-MCNC: 7.1 G/DL (ref 12–15)
HGB BLD-MCNC: 7.1 G/DL (ref 12–15)
HGB BLD-MCNC: 7.5 G/DL (ref 12–15)
HGB BLD-MCNC: 7.6 GM/DL (ref 12–15)
HGB BLD-MCNC: 8.2 GM/DL (ref 12–15)
HGB BLD-MCNC: 8.5 G/DL (ref 12–15)
HGB BLD-MCNC: 9.4 GM/DL (ref 12–15)
INR PPP: 1.4
INR PPP: 1.6
INR PPP: 2.1
MCH RBC QN AUTO: 28.7 PG (ref 26–34)
MCH RBC QN AUTO: 28.7 PG (ref 26–34)
MCH RBC QN AUTO: 29 PG (ref 26–34)
MCHC RBC AUTO-ENTMCNC: 33 G/DL (ref 28–37)
MCHC RBC AUTO-ENTMCNC: 33.6 G/DL (ref 28–37)
MCHC RBC AUTO-ENTMCNC: 33.8 G/DL (ref 28–37)
MCV RBC: 85.3 FL (ref 80–100)
MCV RBC: 85.6 FL (ref 80–100)
MCV RBC: 87 FL (ref 80–100)
PCO2 BLD: 27.6 MMHG (ref 35–45)
PCO2 BLD: 38.1 MMHG (ref 35–45)
PCO2 BLD: 38.8 MMHG (ref 35–45)
PCO2 BLDA: 30 MMHG (ref 35–45)
PCO2 BLDA: 33.6 MMHG (ref 35–45)
PCO2 BLDA: 38.4 MMHG (ref 35–45)
PCO2 BLDA: 43 MMHG (ref 35–45)
PH BLDA: 7.43 [PH] (ref 7.36–7.45)
PH BLDA: 7.48 [PH] (ref 7.36–7.45)
PH BLDA: 7.48 [PH] (ref 7.36–7.45)
PH BLDA: 7.57 [PH] (ref 7.36–7.45)
PLATELET # BLD: 113 THOU/UL (ref 150–400)
PLATELET # BLD: 36 THOU/UL (ref 150–400)
PLATELET # BLD: 55 THOU/UL (ref 150–400)
PO2 BLD: 186.3 MMHG (ref 80–100)
PO2 BLD: 239.7 MMHG (ref 80–100)
PO2 BLD: 241.3 MMHG (ref 80–100)
POC CA IONIZED: 4 MG/DL (ref 4.5–5.3)
POC CA IONIZED: 4.3 MG/DL (ref 4.5–5.3)
POC CA IONIZED: 4.3 MG/DL (ref 4.5–5.3)
POC CA IONIZED: 4.5 MG/DL (ref 4.5–5.3)
POTASSIUM SERPL-SCNC: 2.7 MMOL/L (ref 3.5–5.1)
POTASSIUM SERPL-SCNC: 3.2 MMOL/L (ref 3.5–5.1)
POTASSIUM SERPL-SCNC: 3.3 MMOL/L (ref 3.5–5.1)
POTASSIUM SERPL-SCNC: 3.7 MMOL/L (ref 3.5–5.1)
POTASSIUM SERPL-SCNC: 3.7 MMOL/L (ref 3.5–5.1)
POTASSIUM SERPL-SCNC: 3.9 MMOL/L (ref 3.5–5.1)
PROTHROMBIN TIME: 14.3 SECONDS (ref 9.3–11.4)
PROTHROMBIN TIME: 16.6 SECONDS (ref 9.3–11.4)
PROTHROMBIN TIME: 22.1 SECONDS (ref 9.3–11.4)
RBC # BLD AUTO: 2.63 MIL/UL (ref 4.2–5)
RBC # BLD AUTO: 2.84 MIL/UL (ref 4.2–5)
RBC # BLD AUTO: 3.3 MIL/UL (ref 4.2–5)
SODIUM SERPL-SCNC: 137 MMOL/L (ref 136–145)
SODIUM SERPL-SCNC: 138 MMOL/L (ref 136–145)
SODIUM SERPL-SCNC: 139 MMOL/L (ref 136–145)
SODIUM SERPL-SCNC: 143 MMOL/L (ref 136–145)
SODIUM SERPL-SCNC: 143 MMOL/L (ref 136–145)
SODIUM SERPL-SCNC: 147 MMOL/L (ref 136–145)
WBC # BLD AUTO: 11.3 THOU/UL (ref 4–11)
WBC # BLD AUTO: 12.1 THOU/UL (ref 4–11)
WBC # BLD AUTO: 22.5 THOU/UL (ref 4–11)

## 2019-12-03 PROCEDURE — 56527: CPT

## 2019-12-03 PROCEDURE — 54118: CPT

## 2019-12-03 PROCEDURE — 47335: CPT

## 2019-12-03 PROCEDURE — 05HM33Z INSERTION OF INFUSION DEVICE INTO RIGHT INTERNAL JUGULAR VEIN, PERCUTANEOUS APPROACH: ICD-10-PCS | Performed by: THORACIC SURGERY (CARDIOTHORACIC VASCULAR SURGERY)

## 2019-12-03 PROCEDURE — 5A1221Z PERFORMANCE OF CARDIAC OUTPUT, CONTINUOUS: ICD-10-PCS | Performed by: THORACIC SURGERY (CARDIOTHORACIC VASCULAR SURGERY)

## 2019-12-03 PROCEDURE — 51301: CPT

## 2019-12-03 PROCEDURE — 02100Z9 BYPASS CORONARY ARTERY, ONE ARTERY FROM LEFT INTERNAL MAMMARY, OPEN APPROACH: ICD-10-PCS | Performed by: THORACIC SURGERY (CARDIOTHORACIC VASCULAR SURGERY)

## 2019-12-03 PROCEDURE — 83006 GROWTH STIMULATION GENE 2: CPT

## 2019-12-03 PROCEDURE — 56525: CPT

## 2019-12-03 PROCEDURE — 62110: CPT

## 2019-12-03 PROCEDURE — 56898: CPT

## 2019-12-03 PROCEDURE — 56528: CPT

## 2019-12-03 PROCEDURE — 06BQ4ZZ EXCISION OF LEFT SAPHENOUS VEIN, PERCUTANEOUS ENDOSCOPIC APPROACH: ICD-10-PCS | Performed by: THORACIC SURGERY (CARDIOTHORACIC VASCULAR SURGERY)

## 2019-12-03 PROCEDURE — 65135 INSERT OCULAR IMPLANT: CPT

## 2019-12-03 PROCEDURE — 57080: CPT

## 2019-12-03 PROCEDURE — 57082: CPT

## 2019-12-03 PROCEDURE — 47297: CPT

## 2019-12-03 PROCEDURE — 47002: CPT

## 2019-12-03 PROCEDURE — 52314: CPT

## 2019-12-03 PROCEDURE — 57116: CPT

## 2019-12-03 PROCEDURE — 56760: CPT

## 2019-12-03 PROCEDURE — 50249: CPT

## 2019-12-03 PROCEDURE — 70005: CPT

## 2019-12-03 PROCEDURE — 56668: CPT

## 2019-12-03 PROCEDURE — 56534: CPT

## 2019-12-03 PROCEDURE — 50668: CPT

## 2019-12-03 PROCEDURE — 50456: CPT

## 2019-12-03 PROCEDURE — 50010 RENAL EXPLORATION: CPT

## 2019-12-03 PROCEDURE — 65090: CPT

## 2019-12-03 PROCEDURE — 53327: CPT

## 2019-12-03 PROCEDURE — 62950: CPT

## 2019-12-03 PROCEDURE — 52259: CPT

## 2019-12-03 PROCEDURE — 10078: CPT

## 2019-12-03 PROCEDURE — 57222: CPT

## 2019-12-03 PROCEDURE — 10203: CPT

## 2019-12-03 PROCEDURE — 47001 NDL BIOPSY LVR TM OTH MAJ PX: CPT

## 2019-12-03 PROCEDURE — 56526: CPT

## 2019-12-03 PROCEDURE — 65120: CPT

## 2019-12-03 PROCEDURE — 4A133B3 MONITORING OF ARTERIAL PRESSURE, PULMONARY, PERCUTANEOUS APPROACH: ICD-10-PCS | Performed by: THORACIC SURGERY (CARDIOTHORACIC VASCULAR SURGERY)

## 2019-12-03 PROCEDURE — 53358: CPT

## 2019-12-03 PROCEDURE — 65047: CPT

## 2019-12-03 PROCEDURE — 021309W BYPASS CORONARY ARTERY, FOUR OR MORE ARTERIES FROM AORTA WITH AUTOLOGOUS VENOUS TISSUE, OPEN APPROACH: ICD-10-PCS | Performed by: THORACIC SURGERY (CARDIOTHORACIC VASCULAR SURGERY)

## 2019-12-03 PROCEDURE — 57093: CPT

## 2019-12-03 PROCEDURE — 57167: CPT

## 2019-12-03 PROCEDURE — 56531: CPT

## 2019-12-03 PROCEDURE — 56455: CPT

## 2019-12-03 PROCEDURE — 50498: CPT

## 2019-12-03 PROCEDURE — 56524: CPT

## 2019-12-03 PROCEDURE — 47000 NEEDLE BIOPSY OF LIVER PERQ: CPT

## 2019-12-03 PROCEDURE — 50409: CPT

## 2019-12-03 PROCEDURE — X2RF032 REPLACEMENT OF AORTIC VALVE USING ZOOPLASTIC TISSUE, RAPID DEPLOYMENT TECHNIQUE, OPEN APPROACH, NEW TECHNOLOGY GROUP 2: ICD-10-PCS | Performed by: THORACIC SURGERY (CARDIOTHORACIC VASCULAR SURGERY)

## 2019-12-03 PROCEDURE — 62900: CPT

## 2019-12-03 PROCEDURE — 52131: CPT

## 2019-12-03 PROCEDURE — 48888: CPT

## 2019-12-03 PROCEDURE — 65020: CPT

## 2019-12-03 NOTE — NUR
Pt is more awakes, able to lifted her head out off pillow, follow commands
appropriately. RT placed pt on CPAP trial. She is currently on 40% of FiO2,
will continue to monitor any changes.

## 2019-12-03 NOTE — NUR
Pt's ABG post CPAP trial is showing metabolic acidosis. Call ABG result to
 with CPAP result ,order receive to  extubate pt.

## 2019-12-03 NOTE — NUR
ABG RESULT WAS INFORMED TO DR. MERRITT. ORDERS OBTAINED TO CHANGE VENT
SETTINGS. THIS WAS PERFORMED BY RT.
PATIENT HAD ARRIVED TO UNIT AT ABOUT 1625. SHE WAS PLACED ON ICU CARDIAC
MONITORING. VITAL SIGNS AS DOCUMENTED. SHE IS SEDATED AT THIS TIME. NURSE TO
CONTINUE TO MONITOR PATIENT STATUS AND HEMODYNAMIC MONITORING.

## 2019-12-03 NOTE — NUR
Pt is more awakes, able to lift her head off pillow and able to follow
commands appropriately. RT will proceed with CPAP trial.

## 2019-12-03 NOTE — NUR
PATIENT HEMODYNAMICS AS DOCUMENTED. PER PHYSICIAN, 2 DOSES OF ALBUMIN
PROVIDED. BLOOD PRESSURE READINS HAVE BEEN SBP 80'S-100'S. LINES
AND TUBES CONTINUE TO BE IN PLACE AS DOCUMENTED. SHE IS
DROWSY, HOWEVER WAKES UP AND FOLLOWS SIMPLE COMMANDS INTERMITTENTLY. REPORT
GIVEN TO NIGHT SHIFT RN FOR CONTINUATION OF CARE.

## 2019-12-03 NOTE — NUR
Pt is continue to be on CPAP trial. She is tolerating procedure well. Her
VSS. No changes of cardiac rhythms. Continue with CPAP trial protocol.

## 2019-12-04 VITALS — SYSTOLIC BLOOD PRESSURE: 110 MMHG | DIASTOLIC BLOOD PRESSURE: 43 MMHG

## 2019-12-04 VITALS — SYSTOLIC BLOOD PRESSURE: 98 MMHG | DIASTOLIC BLOOD PRESSURE: 38 MMHG

## 2019-12-04 VITALS — DIASTOLIC BLOOD PRESSURE: 56 MMHG | SYSTOLIC BLOOD PRESSURE: 115 MMHG

## 2019-12-04 VITALS — SYSTOLIC BLOOD PRESSURE: 108 MMHG | DIASTOLIC BLOOD PRESSURE: 47 MMHG

## 2019-12-04 VITALS — SYSTOLIC BLOOD PRESSURE: 113 MMHG | DIASTOLIC BLOOD PRESSURE: 51 MMHG

## 2019-12-04 VITALS — SYSTOLIC BLOOD PRESSURE: 139 MMHG | DIASTOLIC BLOOD PRESSURE: 46 MMHG

## 2019-12-04 VITALS — DIASTOLIC BLOOD PRESSURE: 46 MMHG | SYSTOLIC BLOOD PRESSURE: 113 MMHG

## 2019-12-04 VITALS — DIASTOLIC BLOOD PRESSURE: 51 MMHG | SYSTOLIC BLOOD PRESSURE: 110 MMHG

## 2019-12-04 VITALS — DIASTOLIC BLOOD PRESSURE: 51 MMHG | SYSTOLIC BLOOD PRESSURE: 116 MMHG

## 2019-12-04 VITALS — DIASTOLIC BLOOD PRESSURE: 56 MMHG | SYSTOLIC BLOOD PRESSURE: 122 MMHG

## 2019-12-04 VITALS — DIASTOLIC BLOOD PRESSURE: 48 MMHG | SYSTOLIC BLOOD PRESSURE: 109 MMHG

## 2019-12-04 VITALS — SYSTOLIC BLOOD PRESSURE: 107 MMHG | DIASTOLIC BLOOD PRESSURE: 48 MMHG

## 2019-12-04 LAB
ANION GAP SERPL CALC-SCNC: 12 MMOL/L (ref 7–16)
APTT BLD: 41.7 SECONDS (ref 24.5–32.8)
BE(VIVO): -4.6 MMOL/L
BUN SERPL-MCNC: 17 MG/DL (ref 7–18)
CALCIUM SERPL-MCNC: 8.2 MG/DL (ref 8.5–10.1)
CHLORIDE SERPL-SCNC: 113 MMOL/L (ref 98–107)
CO2 SERPL-SCNC: 21 MMOL/L (ref 21–32)
CREAT SERPL-MCNC: 0.8 MG/DL (ref 0.6–1)
ERYTHROCYTE [DISTWIDTH] IN BLOOD BY AUTOMATED COUNT: 14.4 % (ref 10.5–14.5)
GLUCOSE SERPL-MCNC: 201 MG/DL (ref 74–106)
HCO3 BLD-SCNC: 19.7 MMOL/L (ref 22–26)
HCT VFR BLD CALC: 26.2 % (ref 37–47)
HGB BLD-MCNC: 8.8 GM/DL (ref 12–15)
INR PPP: 1.3
MAGNESIUM SERPL-MCNC: 2.5 MG/DL (ref 1.8–2.4)
MCH RBC QN AUTO: 29.2 PG (ref 26–34)
MCHC RBC AUTO-ENTMCNC: 33.8 G/DL (ref 28–37)
MCV RBC: 86.5 FL (ref 80–100)
PCO2 BLD: 33.1 MMHG (ref 35–45)
PLATELET # BLD: 45 THOU/UL (ref 150–400)
PO2 BLD: 130.8 MMHG (ref 80–100)
POTASSIUM SERPL-SCNC: 3.8 MMOL/L (ref 3.5–5.1)
PROTHROMBIN TIME: 13.2 SECONDS (ref 9.3–11.4)
RBC # BLD AUTO: 3.03 MIL/UL (ref 4.2–5)
SODIUM SERPL-SCNC: 146 MMOL/L (ref 136–145)
WBC # BLD AUTO: 11.4 THOU/UL (ref 4–11)

## 2019-12-04 NOTE — NUR
Pt is well heather blood transfusion. No s/sx of any complication indicates. ABP
has been improving. Continue to monitor her closely.

## 2019-12-04 NOTE — NUR
Nutrition: pt S/P CABG x 6, AVR, POD 1. Received consult to address education
needs. RD will followup when out of ICU and closer to D/C.

## 2019-12-04 NOTE — EKG
[address]  [address]
Phone:  [phone]                    ELECTROCARDIOGRAM REPORT      
_______________________________________________________________________________
 
Name:       COGAN,JOAN S                  Room #:         239-P       ADM IN  
M.R.#:      [id number]     Account #:      [id number]  
Admission:  19    Attend Phys:    Subhash Cordova MD    
Discharge:              Date of Birth:  [birth year]  
                                                          Report #: [id number]
   [id number]
_______________________________________________________________________________
THIS REPORT FOR:   //name//                          
 
                          [facility]
                                       
Test Date:    2019               Test Time:    19:52:13
Pat Name:     BLOSSOM COGAN               Department:   
Patient ID:   [id number]            Room:         239
Gender:       F                        Technician:   AUTUMN MCKENZIE
:          1945               Requested By: Trenton Boyce
Order Number: [id number] MD:   Stevenson Horton
                                 Measurements
Intervals                              Axis          
Rate:         77                       P:            0
MS:           122                      QRS:          39
QRSD:         155                      T:            239
QT:           518                                    
QTc:          587                                    
                           Interpretive Statements
Sinus rhythm
Left bundle branch block
No previous ECG available for comparison
 
Electronically Signed On 2019 8:39:50 CST by Stevenson Horton
[email or web address]
 
 
 
 
 
 
 
 
 
 
 
 
 
 
 
 
 
 
 
  <ELECTRONICALLY SIGNED>
   By: Stevenson Horton MD        
  19     0839
D: 19                           _____________________________________
T: 19                           Stevenson Horton MD          /FLYNN

## 2019-12-04 NOTE — NUR
Pt c/o being nauseated after gave pain med. No relief by zofran, notified
, he will be in this am to eval pt. Cool wash cloth applied. Family
visit this am. Continue to monitor any changes.

## 2019-12-04 NOTE — NUR
CARDIAC INDEX READING 2.0, SWAN NINA REMOVED. PATIENT REPOSITIONED FOR COMFORT
PAIN MEDS GIVEN WITH SOME RELIEF, URINE OUTPUT ADEQUATE. PO FLUIDS ENCOURAGED
WAVES OF NAUSEA BUT REFUSES ZOFRAN WHEN ORDERED.

## 2019-12-04 NOTE — NUR
Case opened to follow for dc planning. Pt is s/p CABGx6 and s/p AVR. She is
awake and visiting with her spouse and dtr this afternoon. Cm role introduced
to them at bedside. The pt spouse Carlos and DTr Mally are on the spokespersons
list. The pt lives with her spouse and was indep prior to her recent MI. She
has 5 steps at home but can stay on the main level. She does not use any dme
but has a cane and rwalker at home if needed. She eats a gluten free diet and
prefers to be called " Jo Ann". Therapy held d/t blood pressure and transfusion
overnight. Will follow along and see how she does with therapy.

## 2019-12-04 NOTE — EKG
[address]  [address]
Phone:  [phone]                    ELECTROCARDIOGRAM REPORT      
_______________________________________________________________________________
 
Name:       COGAN,JOAN S                  Room #:         239-P       ADM IN  
M.R.#:      [id number]     Account #:      [id number]  
Admission:  19    Attend Phys:    Subhash Cordova MD    
Discharge:              Date of Birth:  [birth year]  
                                                          Report #: [id number]
   [id number]
_______________________________________________________________________________
THIS REPORT FOR:   //name//                          
 
                          [facility]
                                       
Test Date:    2019               Test Time:    07:30:57
Pat Name:     BLOSSOM COGAN               Department:   
Patient ID:   [id number]            Room:         239 P
Gender:       F                        Technician:   KERRI SINGLETON
:          1945               Requested By: Trenton Boyce
Order Number: [id number] MD:   Stevenson Horton
                                 Measurements
Intervals                              Axis          
Rate:         66                       P:            76
WA:           149                      QRS:          15
QRSD:         146                      T:            161
QT:           524                                    
QTc:          550                                    
                           Interpretive Statements
Sinus rhythm
Left bundle branch block
No previous ECG available for comparison
 
Electronically Signed On 2019 8:42:22 CST by Stevenson Horton
[email or web address]
 
 
 
 
 
 
 
 
 
 
 
 
 
 
 
 
 
 
 
  <ELECTRONICALLY SIGNED>
   By: Stevenson Horton MD        
  19     0842
D: 19 730                           _____________________________________
T: 19                           Stevenson Horton MD          /FLYNN

## 2019-12-04 NOTE — NUR
PATIENT ATTEMPTING TO FIND A COMFORTABLE POSITION. CARDENE TITRATED TO KEEP
SYSTOLIC BP VIA RIGOBERTO 130 TO 140MMHG.  NOTED TO HAVE MORE NAUSEA AND POOR
APPITITE THIS EVENING, WILL TAKES SIPS OF WATER WITHOUT EMESIS. NUTRITION
DRINK CHANGED TO FLAVOR OF PATIENT'S CHOICE AND REFUSES IT FOR NOW.  PATIENT'S
BED PLACED IN CHAIR POSITION TO ASSIST WITH DRAINING OF CHEST TUBES. PATIENT
OCC CONFUSED AS TO WHERE SHE IS WHEN AWAKENED. PACER WIRES CAPPED PER ORDER.
PATIENT AND FAMILY CONTINUALLY UPDATED AS TO PLAN OF CARE AND REASSURANCE
GIVEN, FAMILY VERBALIZE UNDERSTANDING.

## 2019-12-05 VITALS — DIASTOLIC BLOOD PRESSURE: 36 MMHG | SYSTOLIC BLOOD PRESSURE: 107 MMHG

## 2019-12-05 VITALS — SYSTOLIC BLOOD PRESSURE: 117 MMHG | DIASTOLIC BLOOD PRESSURE: 50 MMHG

## 2019-12-05 VITALS — SYSTOLIC BLOOD PRESSURE: 107 MMHG | DIASTOLIC BLOOD PRESSURE: 48 MMHG

## 2019-12-05 VITALS — SYSTOLIC BLOOD PRESSURE: 103 MMHG | DIASTOLIC BLOOD PRESSURE: 36 MMHG

## 2019-12-05 VITALS — SYSTOLIC BLOOD PRESSURE: 117 MMHG | DIASTOLIC BLOOD PRESSURE: 53 MMHG

## 2019-12-05 LAB
ANION GAP SERPL CALC-SCNC: 10 MMOL/L (ref 7–16)
BUN SERPL-MCNC: 30 MG/DL (ref 7–18)
CALCIUM SERPL-MCNC: 8.3 MG/DL (ref 8.5–10.1)
CHLORIDE SERPL-SCNC: 107 MMOL/L (ref 98–107)
CO2 SERPL-SCNC: 24 MMOL/L (ref 21–32)
CREAT SERPL-MCNC: 0.8 MG/DL (ref 0.6–1)
ERYTHROCYTE [DISTWIDTH] IN BLOOD BY AUTOMATED COUNT: 14.5 % (ref 10.5–14.5)
GLUCOSE SERPL-MCNC: 159 MG/DL (ref 74–106)
HCT VFR BLD CALC: 24.9 % (ref 37–47)
HGB BLD-MCNC: 8.3 GM/DL (ref 12–15)
MCH RBC QN AUTO: 28.6 PG (ref 26–34)
MCHC RBC AUTO-ENTMCNC: 33.2 G/DL (ref 28–37)
MCV RBC: 86.2 FL (ref 80–100)
PLATELET # BLD: 47 THOU/UL (ref 150–400)
POTASSIUM SERPL-SCNC: 3.4 MMOL/L (ref 3.5–5.1)
RBC # BLD AUTO: 2.89 MIL/UL (ref 4.2–5)
SODIUM SERPL-SCNC: 141 MMOL/L (ref 136–145)
WBC # BLD AUTO: 14.2 THOU/UL (ref 4–11)

## 2019-12-05 NOTE — NUR
No heart healthy diet education necessary at this time.  Pt with severe
protein calorie malnutrition-see RD assessment 12/5, and diet has been
liberalized to regular, gluten free + oral supplement Premier.

## 2019-12-05 NOTE — NUR
Bedside report with oncoming nurse, Aun. Pt is confused. Left pleural tube is
patent to Single Atrium drain. No air leak.  Poor oral intake
today-intermittent nausea. Oliguric.  LUPE Butts for Dr Cordova aware
of low urine output. One 250 ml of 5% Albumin given IV. Adequate pain
control. One dose of IV Tylenol was given- see emar. Encouraged use of IS
with max volume 250-500 ml obtain.  Pt reported feeling "tired".
Attempts made to give pt periods of uninterrupted sleep. Family members
at bedside most of the day.

## 2019-12-05 NOTE — NUR
Pt is continue to c/o of mildly nauseous, received zofran with some relief.
Report she is feeling hungry this am. Taking poor PO noted, continue IVF as
order. CTs and Line  remain inplaced, and continue to function properly. No
complication noted in this shift. No changes of cardiac rhythms. Up in chair
this am. Slowly progressing toward goals.

## 2019-12-05 NOTE — PATH
[facility]
[address]
[place]   [address]                     PATHOLOGY RPT PROCEDURE       
_______________________________________________________________________________
 
Name:       COGANBLOSSOM GUERIN                  Room #:         239-P       ADM IN  
M.R.#:      [id number]     Account #:      [id number]  
Admission:  12/03/19    Date of Birth:  [birth year]  
Discharge:                                              Report #:    [id number]
                                                        Path Case #: [id number]
_______________________________________________________________________________
 
[facility] Accession Number: [id number]
.                                                                01
Material submitted:                                        .
aortic body - AORTIC VALVE
.                                                                01
Clinical history:                                          .
Preop DX: Coronary artery disease
Postop DX: Angina pectoris, nonrheumatic aortic (valve) stenosis
.                                                                02
**********************************************************************
Diagnosis:
Aortic valve, replacement:
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
(IUV:mabel; 12/05/2019)
QMS  12/05/2019  1331 Local
**********************************************************************
.                                                                02
Electronically signed:                                     .
Ada Sequeira MD, Pathologist
NPI- [id number]
.                                                                01
Gross description:                                         .
Received in formalin labeled "Cogan, Joan, aortic valve," are three
irregular segments of rubbery, pale white-tan to yellow and extensively
calcified tissue measuring 3.9 x 1.5 x 0.6 cm in aggregate dimensions.
Serial sectioning through all three segments reveals soft, white-tan to
calcified, yellow cut surfaces.  All three segments are submitted
representatively in cassette A1, following decalcification.
(DAC; 12/4/2019)
XDC/XDC  12/04/2019  0959 Local
.                                                                02
Pathologist provided ICD-10:
I35.8
.                                                                02
CPT                                                        .
587293, 304404
Specimen Comment: A courtesy copy of this report has been sent to [phone],
[phone]
Specimen Comment: [phone]
Specimen Comment: Report sent to  and 
***Performed at:  01
   [address]  [id number]
   MD Ken Lyon MD Phone:  [phone]
***Performed at:  02
   [address]  [id number]
 
 
[address]   [address]                     PATHOLOGY RPT PROCEDURE       
_______________________________________________________________________________
 
Name:       COGAN,JOAN S                  Room #:         239-P       [facility] [place]  
M.R.#:      [id number]     Account #:      [id number]  
Admission:  12/03/19    Date of Birth:  [birth year]  
Discharge:                                              Report #:    [id number]
                                                        Path Case #: [id number]
_______________________________________________________________________________
   MD Ada Sequeira MD Phone:  [phone]

## 2019-12-06 VITALS — SYSTOLIC BLOOD PRESSURE: 104 MMHG | DIASTOLIC BLOOD PRESSURE: 56 MMHG

## 2019-12-06 VITALS — DIASTOLIC BLOOD PRESSURE: 50 MMHG | SYSTOLIC BLOOD PRESSURE: 101 MMHG

## 2019-12-06 VITALS — DIASTOLIC BLOOD PRESSURE: 46 MMHG | SYSTOLIC BLOOD PRESSURE: 107 MMHG

## 2019-12-06 LAB
ANION GAP SERPL CALC-SCNC: 8 MMOL/L (ref 7–16)
BUN SERPL-MCNC: 47 MG/DL (ref 7–18)
CALCIUM SERPL-MCNC: 8 MG/DL (ref 8.5–10.1)
CHLORIDE SERPL-SCNC: 105 MMOL/L (ref 98–107)
CO2 SERPL-SCNC: 25 MMOL/L (ref 21–32)
CREAT SERPL-MCNC: 1 MG/DL (ref 0.6–1)
ERYTHROCYTE [DISTWIDTH] IN BLOOD BY AUTOMATED COUNT: 14.6 % (ref 10.5–14.5)
GLUCOSE SERPL-MCNC: 108 MG/DL (ref 74–106)
HCT VFR BLD CALC: 23.8 % (ref 37–47)
HGB BLD-MCNC: 7.9 GM/DL (ref 12–15)
MCH RBC QN AUTO: 29 PG (ref 26–34)
MCHC RBC AUTO-ENTMCNC: 33.1 G/DL (ref 28–37)
MCV RBC: 87.6 FL (ref 80–100)
PLATELET # BLD: 51 THOU/UL (ref 150–400)
POTASSIUM SERPL-SCNC: 3.9 MMOL/L (ref 3.5–5.1)
RBC # BLD AUTO: 2.71 MIL/UL (ref 4.2–5)
SODIUM SERPL-SCNC: 138 MMOL/L (ref 136–145)
WBC # BLD AUTO: 16.3 THOU/UL (ref 4–11)

## 2019-12-06 NOTE — EKG
[address] [employer]
[place]  [address]
Phone:  [phone]                    ELECTROCARDIOGRAM REPORT      
_______________________________________________________________________________
 
Name:       COGAN,JOAN S                  Room #:         239-P       ADM IN  
M.R.#:      [id number]     Account #:      [id number]  
Admission:  19    Attend Phys:    Subhash Cordova MD    
Discharge:              Date of Birth:  [birth year]  
                                                          Report #: [id number]
   [id number]
_______________________________________________________________________________
THIS REPORT FOR:   //name//                          
 
                          [facility]
                                       
Test Date:    2019               Test Time:    07:31:40
Pat Name:     JOAN COGAN               Department:   
Patient ID:   [id number]            Room:         239 P
Gender:       F                        Technician:   GOYO
:          1945               Requested By: Roosevelt Mendes
Order Number: [id number] MD:   Otilio Carvajal
                                 Measurements
Intervals                              Axis          
Rate:         92                       P:            
FL:                                    QRS:          4
QRSD:         151                      T:            161
QT:           430                                    
QTc:          533                                    
                           Interpretive Statements
Atrial fibrillation
Left bundle branch block
Compared to ECG 2019 07:30:57
Sinus rhythm no longer present
 
Electronically Signed On 2019 9:55:33 CST by Otilio Carvajal
[email or web address]
 
 
 
 
 
 
 
 
 
 
 
 
 
 
 
 
 
 
  <ELECTRONICALLY SIGNED>
   By: Otilio Carvajal MD, [facility]   
  19     0955
D: 19 0731                           _____________________________________
T: 19                           Otilio Carvajal MD, FACC     /EPI

## 2019-12-06 NOTE — HC
[facility]
[address]
[place]   [address]                     CONSULTATION                  
_______________________________________________________________________________
 
Name:       COGAN,JOAN S                  Room #:         239-P       ADM IN  
M.R.#:      [id number]                       Account #:      [id number]  
Admission:  12/03/19    Attend Phys:    Subhash Cordova MD    
Discharge:              Date of Birth:  [birth year]  
                                                          Report #: [id number]
                                                                    [id number]   
_______________________________________________________________________________
THIS REPORT FOR:   //name//                          
 
CC: Subhash Crews DO
 
DATE OF SERVICE:  12/04/2019
 
 
CARDIOLOGY CONSULTATION
 
HISTORY OF PRESENT ILLNESS:  The patient is a 74-year-old  white female,
who I was asked to see in the ICU today after she had open heart surgery.  The
patient has a long history of a heart murmur.  She previously was followed by
Dr. Emanuel.  Echocardiogram showed significant aortic stenosis, although for
years, the patient has been asymptomatic.  However, she was admitted to [facility] a month ago with rapid atrial fibrillation.  She was seen by Dr. Pichardo.
 She underwent extensive evaluation there included a carotid Doppler study that
showed greater than 60% stenosis of the right internal carotid artery.  She
underwent repeat echocardiogram in November that showed ejection fraction of
60%, left atrial enlargement, severe aortic stenosis with a peak gradient of 75
mmHg.  She ruled in for a non-STEMI.  Dr. Pichardo performed coronary angiography
that showed an 80% narrowing of the mid LAD, 75% stenosis of the circumflex. 
The right coronary artery had a 50% mid stenosis.  She was felt to have severe
3-vessel coronary artery disease and significant aortic stenosis.  She converted
to sinus rhythm and then was discharged.  After her discharge, she was referred
to Dr. Subhash Cordova in the surgical clinic.  She was felt to be a candidate for
coronary artery bypass surgery and aortic valve replacement.  She was admitted
to the hospital yesterday here at [facility] and had 6-vessel
coronary artery bypass surgery including LIMA graft to the LAD, replacement of
the aortic valve using a tissue valve.  She was extubated last night.  Today,
she is in the ICU and denies any significant complaints.  She denies any
previous history of exertional dyspnea, chest tightness, and syncope.
 
PAST MEDICAL HISTORY:  She has had previous knee replacement, hysterectomy and
cholecystectomy.  She has a history of hypertension.  No history of
hyperlipidemia.
 
MEDICATIONS:  Include Fosamax and Hyzaar.
 
ALLERGIES:  SHE HAS INTOLERANCE TO AMOXICILLIN AND LISINOPRIL.
 
FAMILY HISTORY:  Her brother had bypass surgery.
 
SOCIAL HISTORY:  She is .  She and her  live in [place].  She
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  No smoking or alcohol abuse.
 
 
 
[facility]
[address] [place]   [address]                     CONSULTATION                  
_______________________________________________________________________________
 
Name:       COGAN,JOAN S                  Room #:         239-P       [facility]#:      [id number]                       Account #:      [id number]  
Admission:  12/03/19    Attend Phys:    Subhash Cordova MD    
Discharge:              Date of Birth:  [birth year]  
                                                          Report #: [id number]
                                                                    [id number]   
_______________________________________________________________________________
 
REVIEW OF SYSTEMS:  No history of stroke, asthma, peptic ulcer disease, liver
disease, kidney disease, cancer, or chronic skin condition.
 
PHYSICAL EXAMINATION:
GENERAL:  Revealed an elderly female, lying in bed in the ICU.  She appeared in
no distress.
 
LABORATORY DATA:  Her chest x-ray today showed normal heart size, clear lung
fields.  Her lab work, sodium 146, potassium 3.8, and creatinine 0.8.  Liver
function studies were normal.  INR 1.3.  White blood cell count 9.4, hemoglobin
down to 8.8, platelet count noted to be 45,000.
 
IMPRESSION AND RECOMMENDATIONS:
1.  Coronary artery disease.  Status post multivessel coronary artery bypass
surgery.  Once the platelet count is normal, I would suggest taking aspirin 81
mg a day.
2.  History of atrial fibrillation.  I would recommend amiodarone prophylaxis.
3.  Status post aortic valve replacement using a tissue valve.
4.  Hypertension.  The patient has been on an ARB and diuretic.
5.  Moderate carotid stenosis.
6.  Anemia post-surgery.
 
 
 
 
 
 
 
 
 
 
 
 
 
 
 
 
 
 
 
 
 
 
  <ELECTRONICALLY SIGNED>
   By: Roosevelt Mendes MD, FACC      
  12/06/19     1206
D: 12/04/19 1636                           _____________________________________
T: 12/04/19 2302                           Roosevelt Mendes MD, FACC        /nt

## 2019-12-06 NOTE — NUR
PATIENT RETURNED TO BED AND AMIODARONE BOLUS STARTED WITH BP REMAINING STABLE
MONITOR SHOWING SINUS RUSTY TO AFIB WITH INTERMITTENT BEATS OF A FLUTTER NOTED
WITH BBB. PAIN LEVEL TOLERABLE AS SHE MOVES AROUND UP TO THE DREW AND THE
COMMODE.  DIET ENCOURAGED BUT ONLY TAKING A FEW BITS, PROTIEN DRINKS
ENCOURAGED.

## 2019-12-06 NOTE — NUR
PATIENT UP IN THE CHAIR FOR 3 HOUR THIS AM. UP TO COMMODE X3. CONTINUES TO
HAVE A POOR APPITITE, PEPCID GIVEN WITH RELIEF. PLEURAL CHEST TUBE PULLED AND
PCXR DONE, NO RESP DISTRESS NOTED. MONITOR SHOWING BRIEF INTERMITTENT SINUS
RUSTY WITH CARDIZEM DRIP AT 10MG/HR. OTHERWISE MONITOR SHOWN A FIB. PATIENT
AND FAMILY UPDATED AS TO THE POC AND REASSURANCE GIVEN. QUESTIONS ADDRESSED,
VERBALIZE UNDERSTANDING.

## 2019-12-06 NOTE — NUR
Pt is more awakes and aler this am. No hallucination this am. Still c/o
mildly nauseous. She reported feeling dry,but taking PO fluid poorly. Low UO
noted, Dr. Cordova is aware. CT and all monitoring lines are inplaced. Will
continue her closely.

## 2019-12-06 NOTE — NUR
Pt 's cardiac rythms changed to A-fib, rate 90's to low 100. Denies of any
symptoms. Will admit Amiodarone and Lopressor early than normal schedule. Will
obtain EKG per protocol.

## 2019-12-06 NOTE — NUR
Pt went into A-fib ,rates 90's. Denies of any symptoms. Will give Amiodarone
and Lopressor early than normal shcedule. Will obtain EKG per protocol.

## 2019-12-06 NOTE — O
[facility]
[address]
[place]   [address]                     OPERATIVE REPORT              
_______________________________________________________________________________
 
Name:       COGANBLOSSOM S                  Room #:         239-P       ADM IN  
M.R.#:      [id number]                       Account #:      [id number]  
Admission:  12/03/19    Attend Phys:    Subhash Cordova MD    
Discharge:              Date of Birth:  [birth year]  
                                                          Report #: [id number]
                                                                    [id number]   
_______________________________________________________________________________
THIS REPORT FOR:   //name//                          
 
CC: Subhash Pichardo MD [facility]
    Subhash Crews
 
DATE OF SERVICE:  12/03/2019
 
 
PREOPERATIVE DIAGNOSES:  Coronary artery disease and aortic valve stenosis.
 
OPERATION:  Coronary artery bypass x 6 including left internal mammary artery to
left anterior descending artery; saphenous vein to diagonal, ramus intermedius
and marginal arteries and saphenous vein to posterior descending and
posterolateral branch of the right coronary and aortic valve replacement with 19
mm Chayo-Castaneda bioprosthesis and endoscopic vein harvest.
 
SURGEON:  Subhash Cordova MD
 
ASSISTANT:  LUPE Butts.
 
ANESTHESIA:  General.
 
INDICATIONS:  The patient is a 74-year-old with recent myocardial infarct. 
Catheterization demonstrated severe 3-vessel coronary artery disease.  The
patient was also found to have important calcific aortic stenosis with a valve
area of 0.6 cm2.  Left ventricular function was satisfactory.
 
FINDINGS AND TECHNIQUE:  After general anesthesia was established, saphenous
vein was harvested using an endoscopic approach and prepared for use as a
conduit.
 
Exposure was obtained through median sternotomy.  Left internal mammary artery
was harvested from chest wall.  Pericardial well was made.  Cannulation sutures
were placed.  Heparin was given.  Aorta was cannulated.  Right atrium was
cannulated.  Cardioplegia needle was positioned in the aortic root.  Retrograde
cardioplegic catheter was placed in coronary sinus.  Cardiopulmonary bypass was
established.  The aorta was cross clamped.  Antegrade and retrograde
cardioplegia were given.  Ice was poured into the pericardial well.  The heart
was stopped.
 
During electromechanical arrest, the distal anastomoses were performed and
end-to-side anastomosis was made between the vein and the posterolateral branch
of the right coronary.  This was a 1.5 mm vessel.  Cold cardioplegia was given. 
Same segment of vein was sewn in end-to-side fashion to the small posterior
descending artery.  This was a 1.2 mm vessel.  Cold cardioplegia was given.  A
 
 
 
[facility]
[address] [place]   [address]                     OPERATIVE REPORT              
_______________________________________________________________________________
 
Name:       COGAN,JOAN S                  Room #:         239-P       [facility] [place]  
M.R.#:      [id number]                       Account #:      [id number]  
Admission:  12/03/19    Attend Phys:    Subhash Cordova MD    
Discharge:              Date of Birth:  [birth year]  
                                                          Report #: [id number]
                                                                    [id number]   
_______________________________________________________________________________
separate segment of vein was sewn in end-to-side fashion to the large distal
marginal.  This was a 1.6 mm vessel.  Cold cardioplegia was given.  The same
segment of vein was sewn in end-to-side fashion to the long ramus intermedius. 
All of the coronary arteries were diffusely diseased, but the ramus was the most
severely atheromatous vessel.  We did find a reasonable spot quite distally, but
it was a 1.2 mm vessel.  Cold cardioplegia was given.  The same segment of vein
was sewn in end-to-side fashion to branch of the diagonal artery.  This was a
1.4 mm vessel.  Cold cardioplegia was given.  Left internal mammary artery was
sewn in end-to-side fashion to the distal left anterior descending artery, the
LAD had diffuse atheromatous disease in it as well and it was a 1.4 mm vessel,
were bypassed.  The anastomosis was checked with the temperature technique. 
Cold cardioplegia was given.
 
At this point, attention was turned to the valve.  An aortotomy was made
approximately 15 mm above the coronary ostia.  The calcific trileaflet valve was
excised and the annulus was debrided.
 
Cardioplegia was given every 15 minutes through the retrograde catheter and
through the vein grafts.
 
After the valve was debrided, the cavity of the ventricle was irrigated and
interrupted Ethibond sutures were then placed sequentially through the native
annulus and then through a 19 mm Chayo-Castaneda bioprosthesis chosen for
both patient's annulus and body surface area.  The device was lowered into
place.  The sutures were tied with the Cor-Knot.  The valve seemed to seat
properly.
 
The aortotomy was closed using Carrel technique in 2 layers.  Cold cardioplegia
was given.  Two proximal anastomoses were then performed.  When these were
complete, warm retrograde cardioplegia was given followed by warm continuous
blood to the coronary sinus.  When this infusion was complete, the crossclamp
was removed.  De-airing maneuvers were performed.  The anastomoses were
inspected and found to be satisfactory.
 
As the patient warmed, nice cardiac activity resumed, chest tubes and pacing
wires were placed, a marker was placed around the proximal anastomoses.
 
When the patient was warmed, she was weaned from cardiopulmonary bypass.  Venous
cannula was removed.  Protamine was given.  The aortic cannula was removed. 
Flows were measured in the bypass grafts.
 
When hemostasis was satisfactory, chest was irrigated with antibiotic solution
and then closed in the usual fashion.  The patient was taken to the Intensive
 
 
 
[facility]
[address] [place]   [address]                     OPERATIVE REPORT              
_______________________________________________________________________________
 
Name:       COGAN,JOAN S                  Room #:         239-P       ADM IN  
M.R.#:      [id number]                       Account #:      [id number]  
Admission:  12/03/19    Attend Phys:    Subhash Cordova MD    
Discharge:              Date of Birth:  [birth year]  
                                                          Report #: [id number]
                                                                    [id number]   
_______________________________________________________________________________
Care Unit in satisfactory condition having tolerated the procedure well.  All
counts were reported as correct.
 
 
 
 
 
 
 
 
 
 
 
 
 
 
 
 
 
 
 
 
 
 
 
 
 
 
 
 
 
 
 
 
 
 
 
 
 
 
 
 
 
 
  <ELECTRONICALLY SIGNED>
   By: Subhash Cordova MD            
  12/06/19     1735
D: 12/03/19 2008                           _____________________________________
T: 12/03/19 2105                           Subhash Cordova MD              /nt

## 2019-12-07 VITALS — SYSTOLIC BLOOD PRESSURE: 99 MMHG | DIASTOLIC BLOOD PRESSURE: 80 MMHG

## 2019-12-07 LAB
ANION GAP SERPL CALC-SCNC: 9 MMOL/L (ref 7–16)
BUN SERPL-MCNC: 53 MG/DL (ref 7–18)
CALCIUM SERPL-MCNC: 8.1 MG/DL (ref 8.5–10.1)
CHLORIDE SERPL-SCNC: 105 MMOL/L (ref 98–107)
CO2 SERPL-SCNC: 23 MMOL/L (ref 21–32)
CREAT SERPL-MCNC: 0.9 MG/DL (ref 0.6–1)
ERYTHROCYTE [DISTWIDTH] IN BLOOD BY AUTOMATED COUNT: 14.4 % (ref 10.5–14.5)
GLUCOSE SERPL-MCNC: 101 MG/DL (ref 74–106)
HCT VFR BLD CALC: 25.5 % (ref 37–47)
HGB BLD-MCNC: 8.4 GM/DL (ref 12–15)
MAGNESIUM SERPL-MCNC: 3.4 MG/DL (ref 1.8–2.4)
MCH RBC QN AUTO: 28.9 PG (ref 26–34)
MCHC RBC AUTO-ENTMCNC: 32.8 G/DL (ref 28–37)
MCV RBC: 88 FL (ref 80–100)
PHOSPHATE SERPL-MCNC: 2.8 MG/DL (ref 2.5–4.9)
PLATELET # BLD: 82 THOU/UL (ref 150–400)
POTASSIUM SERPL-SCNC: 4.1 MMOL/L (ref 3.5–5.1)
RBC # BLD AUTO: 2.89 MIL/UL (ref 4.2–5)
SODIUM SERPL-SCNC: 137 MMOL/L (ref 136–145)
WBC # BLD AUTO: 16.2 THOU/UL (ref 4–11)

## 2019-12-07 NOTE — NUR
Uneventful night.  Remains sinus ilda, rates 45-52, on monitor.  Up to BSC
with moderate assist x2.  All drsgs dry and intact.

## 2019-12-07 NOTE — EKG
[address]  [address]
Phone:  [phone]                    ELECTROCARDIOGRAM REPORT      
_______________________________________________________________________________
 
Name:       COGAN,JOAN S                  Room #:         239-P       ADM IN  
M.R.#:      [id number]     Account #:      [id number]  
Admission:  19    Attend Phys:    Subhash Cordova MD    
Discharge:              Date of Birth:  [birth year]  
                                                          Report #: [id number]
   [id number]
_______________________________________________________________________________
THIS REPORT FOR:   //name//                          
 
                          [facility]
                                       
Test Date:    2019               Test Time:    07:09:43
Pat Name:     BLOSSOM COGAN               Department:   
Patient ID:   [id number]            Room:         239 P
Gender:       F                        Technician:   VALENTIN
:          1945               Requested By: Trenton Boyce
Order Number: [id number] MD:   Stevenson Horton
                                 Measurements
Intervals                              Axis          
Rate:         53                       P:            77
TN:           166                      QRS:          10
QRSD:         164                      T:            157
QT:           514                                    
QTc:          483                                    
                           Interpretive Statements
Sinus rhythm
Left bundle branch block
Compared to ECG 2019 07:31:40
Atrial fibrillation no longer present
 
Electronically Signed On 2019 10:49:25 CST by Stevenson Horton
[email or web address]
 
 
 
 
 
 
 
 
 
 
 
 
 
 
 
 
 
 
  <ELECTRONICALLY SIGNED>
   By: Stevenson Horton MD        
  19     1049
D: 19                           _____________________________________
T: 1909                           Stevenson Horton MD          /FLYNN

## 2019-12-07 NOTE — NUR
PT IS ALERT AND ORIENTED X4. LUNGS ARE CLEAR . PT ENCOURAGE TO EAT MORE TODAY
WITH DIET AND HAS IMPROVED. FAMILY BRINGS IN FOOD GLUTIN FREE FOR HER. UP IN
THE ROOM TODAY AMBULATING. VS STABLE. USES INCENTIVE SPIROMETER. UP TO COMMODE
TO USE THE BATHROOM WITH GAIT BELT. BATH DONE TODAY. DENIES ANY PAIN ISSUES ON
A CARDIZEM DRIP FOR A-FIB TODAY ON THE MONITOR. WILL CONTINUE TO ASSESS AND
MONITOR PER NURSING.AND PROGRESS TO PLAN OF CARE WITH GOALS

## 2019-12-08 VITALS — SYSTOLIC BLOOD PRESSURE: 113 MMHG | DIASTOLIC BLOOD PRESSURE: 56 MMHG

## 2019-12-08 VITALS — DIASTOLIC BLOOD PRESSURE: 50 MMHG | SYSTOLIC BLOOD PRESSURE: 117 MMHG

## 2019-12-08 VITALS — SYSTOLIC BLOOD PRESSURE: 142 MMHG | DIASTOLIC BLOOD PRESSURE: 46 MMHG

## 2019-12-08 VITALS — SYSTOLIC BLOOD PRESSURE: 117 MMHG | DIASTOLIC BLOOD PRESSURE: 43 MMHG

## 2019-12-08 VITALS — SYSTOLIC BLOOD PRESSURE: 126 MMHG | DIASTOLIC BLOOD PRESSURE: 50 MMHG

## 2019-12-08 VITALS — DIASTOLIC BLOOD PRESSURE: 46 MMHG | SYSTOLIC BLOOD PRESSURE: 121 MMHG

## 2019-12-08 VITALS — DIASTOLIC BLOOD PRESSURE: 42 MMHG | SYSTOLIC BLOOD PRESSURE: 105 MMHG

## 2019-12-08 VITALS — DIASTOLIC BLOOD PRESSURE: 54 MMHG | SYSTOLIC BLOOD PRESSURE: 126 MMHG

## 2019-12-08 VITALS — DIASTOLIC BLOOD PRESSURE: 40 MMHG | SYSTOLIC BLOOD PRESSURE: 120 MMHG

## 2019-12-08 VITALS — SYSTOLIC BLOOD PRESSURE: 114 MMHG | DIASTOLIC BLOOD PRESSURE: 67 MMHG

## 2019-12-08 VITALS — SYSTOLIC BLOOD PRESSURE: 121 MMHG | DIASTOLIC BLOOD PRESSURE: 49 MMHG

## 2019-12-08 VITALS — DIASTOLIC BLOOD PRESSURE: 54 MMHG | SYSTOLIC BLOOD PRESSURE: 123 MMHG

## 2019-12-08 VITALS — SYSTOLIC BLOOD PRESSURE: 129 MMHG | DIASTOLIC BLOOD PRESSURE: 48 MMHG

## 2019-12-08 VITALS — SYSTOLIC BLOOD PRESSURE: 130 MMHG | DIASTOLIC BLOOD PRESSURE: 52 MMHG

## 2019-12-08 VITALS — SYSTOLIC BLOOD PRESSURE: 125 MMHG | DIASTOLIC BLOOD PRESSURE: 51 MMHG

## 2019-12-08 VITALS — SYSTOLIC BLOOD PRESSURE: 133 MMHG | DIASTOLIC BLOOD PRESSURE: 46 MMHG

## 2019-12-08 VITALS — DIASTOLIC BLOOD PRESSURE: 59 MMHG | SYSTOLIC BLOOD PRESSURE: 135 MMHG

## 2019-12-08 VITALS — DIASTOLIC BLOOD PRESSURE: 49 MMHG | SYSTOLIC BLOOD PRESSURE: 133 MMHG

## 2019-12-08 VITALS — DIASTOLIC BLOOD PRESSURE: 37 MMHG | SYSTOLIC BLOOD PRESSURE: 122 MMHG

## 2019-12-08 VITALS — DIASTOLIC BLOOD PRESSURE: 46 MMHG | SYSTOLIC BLOOD PRESSURE: 98 MMHG

## 2019-12-08 VITALS — SYSTOLIC BLOOD PRESSURE: 131 MMHG | DIASTOLIC BLOOD PRESSURE: 46 MMHG

## 2019-12-08 VITALS — DIASTOLIC BLOOD PRESSURE: 23 MMHG | SYSTOLIC BLOOD PRESSURE: 82 MMHG

## 2019-12-08 VITALS — SYSTOLIC BLOOD PRESSURE: 131 MMHG | DIASTOLIC BLOOD PRESSURE: 43 MMHG

## 2019-12-08 VITALS — DIASTOLIC BLOOD PRESSURE: 58 MMHG | SYSTOLIC BLOOD PRESSURE: 119 MMHG

## 2019-12-08 VITALS — SYSTOLIC BLOOD PRESSURE: 118 MMHG | DIASTOLIC BLOOD PRESSURE: 51 MMHG

## 2019-12-08 VITALS — DIASTOLIC BLOOD PRESSURE: 51 MMHG | SYSTOLIC BLOOD PRESSURE: 126 MMHG

## 2019-12-08 NOTE — NUR
AFTER SEVERAL UNSUCCESSFUL ATTEMPTS TO FIX THE ARTERIAL LINE, PT'S ART LINE
WAS DISCONTINUED PER CHARGE PHILIP BREEN. PT HAS NORMOTENSIVE CUFF
PRESSURE. PT HAS BEEN RUSTY AROUND 55-60'S THROUGHOUT THE NIGHT. PT ON 3L NC.
PT DESAT TO LOW 80'S DURING SLEEP.  PT SITTING UP IN CHAIR THIS AM.
PT PROGRESSING TOWARDS GOALS.

## 2019-12-08 NOTE — EKG
[address]  [address]
Phone:  [phone]                    ELECTROCARDIOGRAM REPORT      
_______________________________________________________________________________
 
Name:       COGAN,JOAN S                  Room #:         239-P       ADM IN  
M.R.#:      [id number]     Account #:      [id number]  
Admission:  19    Attend Phys:    Subhash Cordova MD    
Discharge:              Date of Birth:  [birth year]  
                                                          Report #: [id number]
   [id number]
_______________________________________________________________________________
THIS REPORT FOR:   //name//                          
 
                          [facility]
                                       
Test Date:    2019               Test Time:    07:28:45
Pat Name:     BLOSSOM NATIONAN               Department:   
Patient ID:   [id number]            Room:         239 P
Gender:       F                        Technician:   SHANE
:          1945               Requested By: Roosevelt Mendes
Order Number: [id number] MD:   Stevenson Horton
                                 Measurements
Intervals                              Axis          
Rate:         58                       P:            75
NH:           164                      QRS:          5
QRSD:         162                      T:            141
QT:           521                                    
QTc:          512                                    
                           Interpretive Statements
Sinus rhythm
Left bundle branch block
Compared to ECG 2019 07:09:43
No significant changes
 
Electronically Signed On 2019 11:22:53 CST by Stevenson Horton
[email or web address]
 
 
 
 
 
 
 
 
 
 
 
 
 
 
 
 
 
 
  <ELECTRONICALLY SIGNED>
   By: Stevenson Horton MD        
  19     1122
D: 19                           _____________________________________
T: 19                           Stevenson Horton MD          /FLYNN

## 2019-12-08 NOTE — NUR
PT IS ALERT AND ORIENTED X4. LUNGS ARE CLEAR TO DIMINISHED. UP IN THE [address]
X2 WITH WALKING PER NURSING SUPPORT. GAIT BELT IN PLACE AND YELLOW SOCKS IN
PLACE. ON 2 LITERS NASAL CANULA. FAMILY AT BEDSIDE FOR SUPPORT. CLARE DRESSING
IS DRY AND INTACT. MINIMAL PAIN NOTED 2 TYELNOL GIVEN FOR BACK PAIN AND ACHES.
VOIDS PER BEDSIDE COMMODE WITH NURSING ASSISTANCE. ACURATE INTAKE AND OUTPUT
RECORDED VOIDED 450 TODAY WITH MEASURING. CONTINUES TO PROGRESS TO GOALS.

## 2019-12-09 VITALS — DIASTOLIC BLOOD PRESSURE: 44 MMHG | SYSTOLIC BLOOD PRESSURE: 123 MMHG

## 2019-12-09 VITALS — DIASTOLIC BLOOD PRESSURE: 67 MMHG | SYSTOLIC BLOOD PRESSURE: 117 MMHG

## 2019-12-09 VITALS — DIASTOLIC BLOOD PRESSURE: 65 MMHG | SYSTOLIC BLOOD PRESSURE: 139 MMHG

## 2019-12-09 VITALS — SYSTOLIC BLOOD PRESSURE: 119 MMHG | DIASTOLIC BLOOD PRESSURE: 58 MMHG

## 2019-12-09 VITALS — DIASTOLIC BLOOD PRESSURE: 51 MMHG | SYSTOLIC BLOOD PRESSURE: 122 MMHG

## 2019-12-09 VITALS — SYSTOLIC BLOOD PRESSURE: 107 MMHG | DIASTOLIC BLOOD PRESSURE: 57 MMHG

## 2019-12-09 VITALS — SYSTOLIC BLOOD PRESSURE: 108 MMHG | DIASTOLIC BLOOD PRESSURE: 52 MMHG

## 2019-12-09 VITALS — SYSTOLIC BLOOD PRESSURE: 129 MMHG | DIASTOLIC BLOOD PRESSURE: 66 MMHG

## 2019-12-09 VITALS — DIASTOLIC BLOOD PRESSURE: 68 MMHG | SYSTOLIC BLOOD PRESSURE: 134 MMHG

## 2019-12-09 VITALS — DIASTOLIC BLOOD PRESSURE: 57 MMHG | SYSTOLIC BLOOD PRESSURE: 118 MMHG

## 2019-12-09 VITALS — SYSTOLIC BLOOD PRESSURE: 131 MMHG | DIASTOLIC BLOOD PRESSURE: 63 MMHG

## 2019-12-09 VITALS — SYSTOLIC BLOOD PRESSURE: 122 MMHG | DIASTOLIC BLOOD PRESSURE: 52 MMHG

## 2019-12-09 VITALS — DIASTOLIC BLOOD PRESSURE: 58 MMHG | SYSTOLIC BLOOD PRESSURE: 129 MMHG

## 2019-12-09 VITALS — DIASTOLIC BLOOD PRESSURE: 43 MMHG | SYSTOLIC BLOOD PRESSURE: 121 MMHG

## 2019-12-09 VITALS — DIASTOLIC BLOOD PRESSURE: 45 MMHG | SYSTOLIC BLOOD PRESSURE: 120 MMHG

## 2019-12-09 VITALS — DIASTOLIC BLOOD PRESSURE: 48 MMHG | SYSTOLIC BLOOD PRESSURE: 117 MMHG

## 2019-12-09 VITALS — SYSTOLIC BLOOD PRESSURE: 120 MMHG | DIASTOLIC BLOOD PRESSURE: 43 MMHG

## 2019-12-09 VITALS — DIASTOLIC BLOOD PRESSURE: 46 MMHG | SYSTOLIC BLOOD PRESSURE: 104 MMHG

## 2019-12-09 VITALS — SYSTOLIC BLOOD PRESSURE: 107 MMHG | DIASTOLIC BLOOD PRESSURE: 53 MMHG

## 2019-12-09 VITALS — SYSTOLIC BLOOD PRESSURE: 108 MMHG | DIASTOLIC BLOOD PRESSURE: 51 MMHG

## 2019-12-09 VITALS — SYSTOLIC BLOOD PRESSURE: 121 MMHG | DIASTOLIC BLOOD PRESSURE: 62 MMHG

## 2019-12-09 VITALS — DIASTOLIC BLOOD PRESSURE: 51 MMHG | SYSTOLIC BLOOD PRESSURE: 115 MMHG

## 2019-12-09 VITALS — DIASTOLIC BLOOD PRESSURE: 48 MMHG | SYSTOLIC BLOOD PRESSURE: 135 MMHG

## 2019-12-09 LAB
ANION GAP SERPL CALC-SCNC: 6 MMOL/L (ref 7–16)
BUN SERPL-MCNC: 23 MG/DL (ref 7–18)
CALCIUM SERPL-MCNC: 8.1 MG/DL (ref 8.5–10.1)
CHLORIDE SERPL-SCNC: 103 MMOL/L (ref 98–107)
CO2 SERPL-SCNC: 27 MMOL/L (ref 21–32)
CREAT SERPL-MCNC: 0.6 MG/DL (ref 0.6–1)
ERYTHROCYTE [DISTWIDTH] IN BLOOD BY AUTOMATED COUNT: 14.5 % (ref 10.5–14.5)
GLUCOSE SERPL-MCNC: 85 MG/DL (ref 74–106)
HCT VFR BLD CALC: 25.1 % (ref 37–47)
HGB BLD-MCNC: 8.3 GM/DL (ref 12–15)
MCH RBC QN AUTO: 29.5 PG (ref 26–34)
MCHC RBC AUTO-ENTMCNC: 32.8 G/DL (ref 28–37)
MCV RBC: 89.9 FL (ref 80–100)
PLATELET # BLD: 110 THOU/UL (ref 150–400)
POTASSIUM SERPL-SCNC: 3.4 MMOL/L (ref 3.5–5.1)
RBC # BLD AUTO: 2.8 MIL/UL (ref 4.2–5)
SODIUM SERPL-SCNC: 136 MMOL/L (ref 136–145)
WBC # BLD AUTO: 12.1 THOU/UL (ref 4–11)

## 2019-12-09 NOTE — NUR
ASSUMED CARE @ 0700 12/9/19, PT ASSESSMENTS AND VSS COMPLETE PER ICU
PROTOCOL, SINCE START OF THIS SHIFT, PT HR GOING DOWN IN THE 30'S, BUT NOT
SUSTAINING, IT GOES BACK IN THE 50-60'S AFTER 2-3 SECONDS. EDELMIRA (CV PA)
CALLED 0810, RN TOLD TO DEFERE TO CARDIOLOGY DR LYNN. DR LYNN PAGED 0834.
WILL CONTINUE TO MONITOR.

## 2019-12-09 NOTE — EKG
[address]  [address]
Phone:  [phone]                    ELECTROCARDIOGRAM REPORT      
_______________________________________________________________________________
 
Name:       COGAN,JOAN S                  Room #:         239-P       ADM IN  
M.R.#:      [id number]     Account #:      [id number]  
Admission:  19    Attend Phys:    Subhash Cordova MD    
Discharge:              Date of Birth:  [birth year]  
                                                          Report #: [id number]
   [id number]
_______________________________________________________________________________
THIS REPORT FOR:   //name//                          
 
                          [facility]
                                       
Test Date:    2019               Test Time:    08:36:25
Pat Name:     JOAN COGAN               Department:   
Patient ID:   [id number]            Room:         239 P
Gender:       F                        Technician:   VALENTIN
:          1945               Requested By: Roosevelt Mendes
Order Number: [id number] MD:   Otilio Carvajal
                                 Measurements
Intervals                              Axis          
Rate:         100                      P:            
ME:                                    QRS:          3
QRSD:         153                      T:            173
QT:           413                                    
QTc:          533                                    
                           Interpretive Statements
Atrial fibrillation
Left bundle branch block
Compared to ECG 2019 07:28:45
Sinus rhythm no longer present
 
Electronically Signed On 2019 8:58:37 CST by Otilio Carvajal
[email or web address]
 
 
 
 
 
 
 
 
 
 
 
 
 
 
 
 
 
 
  <ELECTRONICALLY SIGNED>
   By: Otilio Carvajal MD, [facility]   
  19     0858
D: 12/836                           _____________________________________
T: 19                           Otilio Carvajal MD, FACC     /EPI

## 2019-12-09 NOTE — NUR
NO OVERNIGHT EVENTS. PT. UP TO BEDSIDE COMMODE 3 TIMES WITH 1 PERSON ASSIST
WITH GAIT BELT. PT. INCREASED FLUID INTAKE THIS SHIFT AND IS BECOMING MORE
INDEPENDENT. ASSESSMENTS AND VITAL SIGNS AS CHARTED. CONTINUE TO FOLLOW POC.
WILL CONTINUE TO MONITOR.

## 2019-12-10 VITALS — DIASTOLIC BLOOD PRESSURE: 41 MMHG | SYSTOLIC BLOOD PRESSURE: 136 MMHG

## 2019-12-10 VITALS — DIASTOLIC BLOOD PRESSURE: 37 MMHG | SYSTOLIC BLOOD PRESSURE: 113 MMHG

## 2019-12-10 VITALS — SYSTOLIC BLOOD PRESSURE: 121 MMHG | DIASTOLIC BLOOD PRESSURE: 52 MMHG

## 2019-12-10 VITALS — SYSTOLIC BLOOD PRESSURE: 130 MMHG | DIASTOLIC BLOOD PRESSURE: 48 MMHG

## 2019-12-10 VITALS — DIASTOLIC BLOOD PRESSURE: 49 MMHG | SYSTOLIC BLOOD PRESSURE: 127 MMHG

## 2019-12-10 VITALS — DIASTOLIC BLOOD PRESSURE: 41 MMHG | SYSTOLIC BLOOD PRESSURE: 110 MMHG

## 2019-12-10 VITALS — SYSTOLIC BLOOD PRESSURE: 108 MMHG | DIASTOLIC BLOOD PRESSURE: 43 MMHG

## 2019-12-10 VITALS — SYSTOLIC BLOOD PRESSURE: 118 MMHG | DIASTOLIC BLOOD PRESSURE: 51 MMHG

## 2019-12-10 VITALS — SYSTOLIC BLOOD PRESSURE: 100 MMHG | DIASTOLIC BLOOD PRESSURE: 49 MMHG

## 2019-12-10 VITALS — SYSTOLIC BLOOD PRESSURE: 119 MMHG | DIASTOLIC BLOOD PRESSURE: 61 MMHG

## 2019-12-10 VITALS — SYSTOLIC BLOOD PRESSURE: 123 MMHG | DIASTOLIC BLOOD PRESSURE: 62 MMHG

## 2019-12-10 VITALS — DIASTOLIC BLOOD PRESSURE: 51 MMHG | SYSTOLIC BLOOD PRESSURE: 96 MMHG

## 2019-12-10 VITALS — SYSTOLIC BLOOD PRESSURE: 119 MMHG | DIASTOLIC BLOOD PRESSURE: 56 MMHG

## 2019-12-10 VITALS — DIASTOLIC BLOOD PRESSURE: 47 MMHG | SYSTOLIC BLOOD PRESSURE: 122 MMHG

## 2019-12-10 VITALS — DIASTOLIC BLOOD PRESSURE: 44 MMHG | SYSTOLIC BLOOD PRESSURE: 125 MMHG

## 2019-12-10 VITALS — DIASTOLIC BLOOD PRESSURE: 68 MMHG | SYSTOLIC BLOOD PRESSURE: 115 MMHG

## 2019-12-10 VITALS — DIASTOLIC BLOOD PRESSURE: 49 MMHG | SYSTOLIC BLOOD PRESSURE: 121 MMHG

## 2019-12-10 VITALS — SYSTOLIC BLOOD PRESSURE: 110 MMHG | DIASTOLIC BLOOD PRESSURE: 46 MMHG

## 2019-12-10 VITALS — SYSTOLIC BLOOD PRESSURE: 109 MMHG | DIASTOLIC BLOOD PRESSURE: 53 MMHG

## 2019-12-10 VITALS — DIASTOLIC BLOOD PRESSURE: 74 MMHG | SYSTOLIC BLOOD PRESSURE: 128 MMHG

## 2019-12-10 VITALS — SYSTOLIC BLOOD PRESSURE: 122 MMHG | DIASTOLIC BLOOD PRESSURE: 46 MMHG

## 2019-12-10 VITALS — DIASTOLIC BLOOD PRESSURE: 40 MMHG | SYSTOLIC BLOOD PRESSURE: 123 MMHG

## 2019-12-10 LAB
INR PPP: 1.1
PROTHROMBIN TIME: 11.9 SECONDS (ref 9.3–11.4)

## 2019-12-10 PROCEDURE — 0JH606Z INSERTION OF PACEMAKER, DUAL CHAMBER INTO CHEST SUBCUTANEOUS TISSUE AND FASCIA, OPEN APPROACH: ICD-10-PCS | Performed by: THORACIC SURGERY (CARDIOTHORACIC VASCULAR SURGERY)

## 2019-12-10 NOTE — EKG
[address] [employer]
[place]  [address]
Phone:  [phone]                    ELECTROCARDIOGRAM REPORT      
_______________________________________________________________________________
 
Name:       COGAN,JOAN S                  Room #:         239-P       ADM IN  
M.R.#:      [id number]     Account #:      [id number]  
Admission:  19    Attend Phys:    Subhash Cordova MD    
Discharge:              Date of Birth:  [birth year]  
                                                          Report #: [id number]
   [id number]
_______________________________________________________________________________
THIS REPORT FOR:   //name//                          
 
                          [facility]
                                       
Test Date:    2019-12-10               Test Time:    07:45:58
Pat Name:     JOAN COGAN               Department:   
Patient ID:   [id number]            Room:         239 P
Gender:       F                        Technician:   GOYO
:          1945               Requested By: Roosevelt Mendes
Order Number: [id number] MD:   Otilio Carvajal
                                 Measurements
Intervals                              Axis          
Rate:         67                       P:            99
OH:           149                      QRS:          15
QRSD:         161                      T:            158
QT:           494                                    
QTc:          522                                    
                           Interpretive Statements
Sinus rhythm
Left bundle branch block
Compared to ECG 2019 08:36:25
Atrial fibrillation no longer present
 
Electronically Signed On 12- 8:38:55 CST by Otilio Carvajal
[email or web address]
 
 
 
 
 
 
 
 
 
 
 
 
 
 
 
 
 
 
  <ELECTRONICALLY SIGNED>
   By: Otilio Carvajal MD, [facility]   
  12/10/19     0838
D: 12/10/19 0745                           _____________________________________
T: 12/10/19 0745                           Otilio Carvajal MD, FACC     /EPI

## 2019-12-11 VITALS — DIASTOLIC BLOOD PRESSURE: 52 MMHG | SYSTOLIC BLOOD PRESSURE: 114 MMHG

## 2019-12-11 VITALS — DIASTOLIC BLOOD PRESSURE: 62 MMHG | SYSTOLIC BLOOD PRESSURE: 113 MMHG

## 2019-12-11 VITALS — SYSTOLIC BLOOD PRESSURE: 115 MMHG | DIASTOLIC BLOOD PRESSURE: 58 MMHG

## 2019-12-11 VITALS — DIASTOLIC BLOOD PRESSURE: 54 MMHG | SYSTOLIC BLOOD PRESSURE: 110 MMHG

## 2019-12-11 VITALS — SYSTOLIC BLOOD PRESSURE: 116 MMHG | DIASTOLIC BLOOD PRESSURE: 56 MMHG

## 2019-12-11 VITALS — SYSTOLIC BLOOD PRESSURE: 122 MMHG | DIASTOLIC BLOOD PRESSURE: 50 MMHG

## 2019-12-11 VITALS — SYSTOLIC BLOOD PRESSURE: 122 MMHG | DIASTOLIC BLOOD PRESSURE: 57 MMHG

## 2019-12-11 VITALS — DIASTOLIC BLOOD PRESSURE: 56 MMHG | SYSTOLIC BLOOD PRESSURE: 108 MMHG

## 2019-12-11 VITALS — SYSTOLIC BLOOD PRESSURE: 95 MMHG | DIASTOLIC BLOOD PRESSURE: 38 MMHG

## 2019-12-11 VITALS — SYSTOLIC BLOOD PRESSURE: 111 MMHG | DIASTOLIC BLOOD PRESSURE: 47 MMHG

## 2019-12-11 VITALS — SYSTOLIC BLOOD PRESSURE: 109 MMHG | DIASTOLIC BLOOD PRESSURE: 51 MMHG

## 2019-12-11 VITALS — SYSTOLIC BLOOD PRESSURE: 109 MMHG | DIASTOLIC BLOOD PRESSURE: 53 MMHG

## 2019-12-11 NOTE — NUR
EARLY THIS AM, PT AMBULATED TO TOILET, THEN TRANSFERRED PER WHEELCHAIR DOWN TO
RADIOLOGY FOR 2 VIEW CHEST XRAY. PT WEAK/DIZZY WHEN PERFORMING XRAYS. PT SAT
IN CHAIR TILL DIZZINESS RESOLVED, THEN NEXT VIEW COMPLETED.
UPON RETURN TO ROOM, PT SITTING UP IN RECLINER AND TOLERATING WELL. IN A FEW
MINUTES, SHE BECAME TIRED WANTING TO RETURN TO BED. PLACED IN RECLINING
POSITION, THEN DOZED OFF TO SLEEP.
AT 1035, UP TO BS, THEN RETURNED TO BED WITH ONE ASSIST.
AT 1130, PT AMBULATED WITH PHYSICAL THERAPY PARTIALLY AROUND THE ICU UNIT IN
POD #1. TOO TIRED TO CONTINUE AMBULATING, WEAK. PT SAT DOWN IN CHAIR THEN
RETURNED TO ROOM, THEN PLACED IN CHAIR.
AT 1330, TO TOILET THEN BACK TO BED WITH ONE ASSIST.
AT 1530, AMBULATED WITH PHYSICAL THERAPY, WELL TOLERATED.
AT 1605, WHEN LUPE MEADOWS PRESENT, HE REMOVED THE MIDSTERNAL LCARE
DRESSING, THEN REPLACED DRESSING WITH NONADHERING SURGICAL DRESSING. REMOVED
PACING WIRE.
AT 1700, PT AMBULATED TO TOILET TO VOID THEN RETURNED TO BED WITH ONE ASSIST.
PT IMPROVED THROUGHOUT SHIFT, PACEMAKER FUNCTIONING WITH ATRIAL, VENT,
ATRIAL/VENT PACED BEATS WHEN NEEDD, TAKING DEEP BREATHS/NONPRODUCTIVE COUGH,
APPETITE IMPROVED, URINE OUTPUT ADEQUATE HOWEVER REMAINS CONCENTRATED- [place].
DAUGHTER AND SPOUSE PRESENT AT BEDSIDE, BOTH PROVIDING SUPPORT. Spiritual Care Assessment/Progress Note [address] 
 
 
NAME: Toshia Lee      MRN: [id number] AGE: 61 y.o. SEX: female Jehovah's witness Affiliation: [employer]  
Language: [place] 2/18/2019     Total Time (in minutes): 10 Spiritual Assessment begun in [facility] through conversation with: 
  
    [x]Patient        [] Family    [] Friend(s) Reason for Consult: Initial/Spiritual assessment, patient floor Spiritual beliefs: (Please include comment if needed) [x] Identifies with a richard tradition:     
   [x] Supported by a richard community:        
   [] Claims no spiritual orientation:       
   [] Seeking spiritual identity:            
   [] Adheres to an individual form of spirituality:       
   [x] Not able to assess:                   
 
    
Identified resources for coping:  
   [x] Prayer                           
   [x] Music                  [] Guided Imagery 
   [] Family/friends                 [] Pet visits [] Devotional reading                         [] Unknown 
   [] Other Interventions offered during this visit: (See comments for more details) Patient Interventions: Affirmation of richard, Affirmation of emotions/emotional suffering, Communion (Amish), Initial/Spiritual assessment, patient floor, Normalization of emotional/spiritual concerns, Prayer (assurance of), Prayer (actual), Jehovah's witness beliefs/image of God discussed Plan of Care: 
 
 [x] Support spiritual and/or cultural needs  
 [] Support AMD and/or advance care planning process    
 [] Support grieving process 
 [] Coordinate Rites and/or Rituals  
 [] Coordination with community clergy [] No spiritual needs identified at this time 
 [] Detailed Plan of Care below (See Comments)  [] Make referral to Music Therapy 
[] Make referral to Pet Therapy    
[] Make referral to Addiction services 
[] Make referral to [facility] [] Make referral to Spiritual Care Partner 
[] No future visits requested       
[x] Follow up visits as needed Comments: visited pt in room 602. Pt stated that she is concerned that dr's have not made any decisions yet as to her procedure. Pt seemed to be frustrated. Pt stated that she is getting good care from the nurses but is ready to get things over with. Pt asked  to keep her in prayer.  assured pt of continued prayer and spiritual support. Nadya Olivo,  Intern, MDiv 
[phone])

## 2019-12-11 NOTE — NUR
FOLLOWING FOR DC PLANNING. CLINICAL INFO REVIEWED. PT HAD PPM PLACED YESTERDAY
AND WILL TRNSFER TO CCU TODAY. WILL NEED REHAB PRIOR TO HOME AND 5N FOLLOWING.
5N TO HAVE BED THURSDAY OR FRIDAY IF PLANNED DISCHARGES ON UNIT HAPPEN.
UPDATED EDELMIRA MENDEZ FROM CTS TEAM.

## 2019-12-11 NOTE — EKG
[address] [employer]
[place]  [address]
Phone:  [phone]                    ELECTROCARDIOGRAM REPORT      
_______________________________________________________________________________
 
Name:       COGAN,JOAN S                  Room #:         239-P       ADM IN  
M.R.#:      [id number]     Account #:      [id number]  
Admission:  19    Attend Phys:    Subhash Cordova MD    
Discharge:              Date of Birth:  [birth year]  
                                                          Report #: [id number]
   [id number]
_______________________________________________________________________________
THIS REPORT FOR:   //name//                          
 
                          [facility]
                                       
Test Date:    2019               Test Time:    07:34:28
Pat Name:     JOAN COGAN               Department:   
Patient ID:   [id number]            Room:         239 P
Gender:       F                        Technician:   KERRI SINGLETON
:          1945               Requested By: Roosevelt Mendes
Order Number: [id number] MD:   Stevenson Horton
                                 Measurements
Intervals                              Axis          
Rate:         104                      P:            
OR:                                    QRS:          48
QRSD:         159                      T:            205
QT:           400                                    
QTc:          527                                    
                           Interpretive Statements
Atrial fibrillation
Left bundle branch block
Compared to ECG 12/10/2019 07:45:58
Sinus rhythm no longer present
 
Electronically Signed On 2019 12:32:45 CST by Stevenson Horton
[email or web address]
 
 
 
 
 
 
 
 
 
 
 
 
 
 
 
 
 
 
  <ELECTRONICALLY SIGNED>
   By: Stevenson Horton MD        
  19     1232
D: 19                           _____________________________________
T: 19                           Stevenson Horton MD          /FLYNN

## 2019-12-12 VITALS — SYSTOLIC BLOOD PRESSURE: 92 MMHG | DIASTOLIC BLOOD PRESSURE: 41 MMHG

## 2019-12-12 VITALS — DIASTOLIC BLOOD PRESSURE: 48 MMHG | SYSTOLIC BLOOD PRESSURE: 126 MMHG

## 2019-12-12 VITALS — DIASTOLIC BLOOD PRESSURE: 58 MMHG | SYSTOLIC BLOOD PRESSURE: 120 MMHG

## 2019-12-12 VITALS — DIASTOLIC BLOOD PRESSURE: 41 MMHG | SYSTOLIC BLOOD PRESSURE: 119 MMHG

## 2019-12-12 VITALS — SYSTOLIC BLOOD PRESSURE: 113 MMHG | DIASTOLIC BLOOD PRESSURE: 47 MMHG

## 2019-12-12 VITALS — SYSTOLIC BLOOD PRESSURE: 109 MMHG | DIASTOLIC BLOOD PRESSURE: 46 MMHG

## 2019-12-12 VITALS — SYSTOLIC BLOOD PRESSURE: 119 MMHG | DIASTOLIC BLOOD PRESSURE: 44 MMHG

## 2019-12-12 VITALS — SYSTOLIC BLOOD PRESSURE: 111 MMHG | DIASTOLIC BLOOD PRESSURE: 48 MMHG

## 2019-12-12 VITALS — SYSTOLIC BLOOD PRESSURE: 105 MMHG | DIASTOLIC BLOOD PRESSURE: 43 MMHG

## 2019-12-12 VITALS — SYSTOLIC BLOOD PRESSURE: 95 MMHG | DIASTOLIC BLOOD PRESSURE: 44 MMHG

## 2019-12-12 VITALS — SYSTOLIC BLOOD PRESSURE: 105 MMHG | DIASTOLIC BLOOD PRESSURE: 44 MMHG

## 2019-12-12 VITALS — SYSTOLIC BLOOD PRESSURE: 115 MMHG | DIASTOLIC BLOOD PRESSURE: 43 MMHG

## 2019-12-12 VITALS — DIASTOLIC BLOOD PRESSURE: 50 MMHG | SYSTOLIC BLOOD PRESSURE: 119 MMHG

## 2019-12-12 VITALS — DIASTOLIC BLOOD PRESSURE: 44 MMHG | SYSTOLIC BLOOD PRESSURE: 118 MMHG

## 2019-12-12 VITALS — SYSTOLIC BLOOD PRESSURE: 101 MMHG | DIASTOLIC BLOOD PRESSURE: 53 MMHG

## 2019-12-12 VITALS — DIASTOLIC BLOOD PRESSURE: 46 MMHG | SYSTOLIC BLOOD PRESSURE: 116 MMHG

## 2019-12-12 VITALS — SYSTOLIC BLOOD PRESSURE: 122 MMHG | DIASTOLIC BLOOD PRESSURE: 47 MMHG

## 2019-12-12 VITALS — SYSTOLIC BLOOD PRESSURE: 113 MMHG | DIASTOLIC BLOOD PRESSURE: 38 MMHG

## 2019-12-12 VITALS — DIASTOLIC BLOOD PRESSURE: 56 MMHG | SYSTOLIC BLOOD PRESSURE: 117 MMHG

## 2019-12-12 VITALS — SYSTOLIC BLOOD PRESSURE: 102 MMHG | DIASTOLIC BLOOD PRESSURE: 40 MMHG

## 2019-12-12 VITALS — DIASTOLIC BLOOD PRESSURE: 38 MMHG | SYSTOLIC BLOOD PRESSURE: 121 MMHG

## 2019-12-12 VITALS — DIASTOLIC BLOOD PRESSURE: 45 MMHG | SYSTOLIC BLOOD PRESSURE: 105 MMHG

## 2019-12-12 LAB
ANION GAP SERPL CALC-SCNC: 7 MMOL/L (ref 7–16)
BF MACROPHAGE: 26
BF NEUTROPHILS: 47
BF NUCLEATED CELLS: 220
BF RBC: (no result)
BUN SERPL-MCNC: 21 MG/DL (ref 7–18)
CALCIUM SERPL-MCNC: 8.6 MG/DL (ref 8.5–10.1)
CHLORIDE SERPL-SCNC: 105 MMOL/L (ref 98–107)
CLARITY UR: (no result)
CO2 SERPL-SCNC: 28 MMOL/L (ref 21–32)
COLOR UR: (no result)
CREAT SERPL-MCNC: 0.6 MG/DL (ref 0.6–1)
ERYTHROCYTE [DISTWIDTH] IN BLOOD BY AUTOMATED COUNT: 15.3 % (ref 10.5–14.5)
GLUCOSE SERPL-MCNC: 95 MG/DL (ref 74–106)
HCT VFR BLD CALC: 22.8 % (ref 37–47)
HGB BLD-MCNC: 7.3 GM/DL (ref 12–15)
MCH RBC QN AUTO: 30 PG (ref 26–34)
MCHC RBC AUTO-ENTMCNC: 32.1 G/DL (ref 28–37)
MCV RBC: 93.3 FL (ref 80–100)
PLATELET # BLD: 162 THOU/UL (ref 150–400)
POTASSIUM SERPL-SCNC: 3.7 MMOL/L (ref 3.5–5.1)
RBC # BLD AUTO: 2.44 MIL/UL (ref 4.2–5)
SODIUM SERPL-SCNC: 140 MMOL/L (ref 136–145)
SOURCE: (no result)
SOURCE: (no result)
SPECIMEN VOL 24H UR: 20 ML
WBC # BLD AUTO: 14 THOU/UL (ref 4–11)

## 2019-12-12 PROCEDURE — 0W993ZZ DRAINAGE OF RIGHT PLEURAL CAVITY, PERCUTANEOUS APPROACH: ICD-10-PCS | Performed by: THORACIC SURGERY (CARDIOTHORACIC VASCULAR SURGERY)

## 2019-12-12 NOTE — NUR
PT AWAKE AND ALERT. SLEPT AT INTERVALS. UP TO BEDSIDE COMMODE. 800 CC UO
THIS SHIFT. PACED RHYTHM.  DRESSINGS INTACT.  S/C FOR THORACENTESIS  TODAY
A VERY SWEET LITTLE LADY. WILL CONT TO MONITOR.

## 2019-12-12 NOTE — NUR
RETURNED FROM RADIOLOGY WITH CARDIAC MONITOR, TRANSPORTER AND SPOUSE. RESP
EVEN AND UNLABORED, 02 AT 3 L/NC, POSITIONED ON R SIDE FOR COMFORT, LUNGS
CLEAR IN UPPER LOBES AND DIMINSHED IN BASES. PT TIRED, PREFERS TO REST.

## 2019-12-12 NOTE — NUR
ASSUMED CARE OF PT. AWAKE AND ALERT. A VERY DELIGHTFUL LITTLE LADY
PACED RHYTHM  JENNIFER CHEST PACEMAKER INCISION INTACT. CHEST DRESSING INTACT
LEFT  GROIN AND THIGH REMAIN VERY BRUISED AND SWOLLEN.  UP TO BS COMMODE
HAD A LARGE SOFT BROWN STOOL. VOIDED CONCENTRATED URINE. DENIES CP NOR SOA.
WILL CONT TO MONITOR CLOSELY.  CCU TELE OVERFLOW AT PRESENT.

## 2019-12-12 NOTE — EKG
[address]  [address]
Phone:  [phone]                    ELECTROCARDIOGRAM REPORT      
_______________________________________________________________________________
 
Name:       COGAN,JOAN S                  Room #:         239-P       ADM IN  
M.R.#:      [id number]     Account #:      [id number]  
Admission:  19    Attend Phys:    Subhash Cordova MD    
Discharge:              Date of Birth:  [birth year]  
                                                          Report #: [id number]
   [id number]
_______________________________________________________________________________
THIS REPORT FOR:   //name//                          
 
                          [facility]
                                       
Test Date:    2019               Test Time:    07:14:20
Pat Name:     JOAN COGAN               Department:   
Patient ID:   [id number]            Room:         239 P
Gender:       F                        Technician:   KERRI SINGLETON
:          1945               Requested By: Roosevelt Mendes
Order Number: [id number] MD:   Stevenson Horton
                                 Measurements
Intervals                              Axis          
Rate:         90                       P:            0
SC:           156                      QRS:          44
QRSD:         156                      T:            199
QT:           439                                    
QTc:          538                                    
                           Interpretive Statements
Ventricular-paced complexes
No further rhythm analysis attempted due to paced rhythm
Left bundle branch block
Compared to ECG 2019 07:34:28
Atrial fibrillation no longer present
 
Electronically Signed On 2019 8:34:41 CST by Stevenson Horton
[email or web address]
 
 
 
 
 
 
 
 
 
 
 
 
 
 
 
 
 
  <ELECTRONICALLY SIGNED>
   By: Stevenson Horton MD        
  19     0834
D: 19                           _____________________________________
T: 19                           Stevenson Horton MD          /EPI

## 2019-12-12 NOTE — NUR
THIS AM RESTING COMFORTABLY, EASILY AWAKENED, AFIB WITH UNDERLYING ATRIAL,
VENTRICULAR AND A/V PACED RHYTHM, TOLERATED SMALL AMOUNT OF BREAKFAST, ROOM
AIR, AMBULATED TO TOILET. CONSENT PREVIOUSLY SIGNED, TO RADIOLOGY PER CART
WITH MONITOR, ACCOMPANIED BY SPOUSE, TRANSPORTER, RN.

## 2019-12-13 ENCOUNTER — HOSPITAL ENCOUNTER (INPATIENT)
Dept: HOSPITAL 35 - REHABU | Age: 74
LOS: 5 days | Discharge: HOME HEALTH | DRG: 947 | End: 2019-12-18
Attending: PHYSICAL MEDICINE & REHABILITATION | Admitting: PHYSICAL MEDICINE & REHABILITATION
Payer: COMMERCIAL

## 2019-12-13 VITALS — DIASTOLIC BLOOD PRESSURE: 52 MMHG | SYSTOLIC BLOOD PRESSURE: 103 MMHG

## 2019-12-13 VITALS — HEIGHT: 60 IN | BODY MASS INDEX: 31.86 KG/M2 | WEIGHT: 162.3 LBS

## 2019-12-13 VITALS — DIASTOLIC BLOOD PRESSURE: 47 MMHG | SYSTOLIC BLOOD PRESSURE: 107 MMHG

## 2019-12-13 VITALS — DIASTOLIC BLOOD PRESSURE: 45 MMHG | SYSTOLIC BLOOD PRESSURE: 129 MMHG

## 2019-12-13 VITALS — DIASTOLIC BLOOD PRESSURE: 47 MMHG | SYSTOLIC BLOOD PRESSURE: 118 MMHG

## 2019-12-13 VITALS — DIASTOLIC BLOOD PRESSURE: 46 MMHG | SYSTOLIC BLOOD PRESSURE: 135 MMHG

## 2019-12-13 VITALS — SYSTOLIC BLOOD PRESSURE: 100 MMHG | DIASTOLIC BLOOD PRESSURE: 43 MMHG

## 2019-12-13 VITALS — DIASTOLIC BLOOD PRESSURE: 43 MMHG | SYSTOLIC BLOOD PRESSURE: 113 MMHG

## 2019-12-13 VITALS — DIASTOLIC BLOOD PRESSURE: 49 MMHG | SYSTOLIC BLOOD PRESSURE: 134 MMHG

## 2019-12-13 VITALS — SYSTOLIC BLOOD PRESSURE: 110 MMHG | DIASTOLIC BLOOD PRESSURE: 53 MMHG

## 2019-12-13 VITALS — SYSTOLIC BLOOD PRESSURE: 112 MMHG | DIASTOLIC BLOOD PRESSURE: 52 MMHG

## 2019-12-13 VITALS — DIASTOLIC BLOOD PRESSURE: 48 MMHG | SYSTOLIC BLOOD PRESSURE: 108 MMHG

## 2019-12-13 VITALS — SYSTOLIC BLOOD PRESSURE: 106 MMHG | DIASTOLIC BLOOD PRESSURE: 43 MMHG

## 2019-12-13 VITALS — DIASTOLIC BLOOD PRESSURE: 46 MMHG | SYSTOLIC BLOOD PRESSURE: 106 MMHG

## 2019-12-13 VITALS — DIASTOLIC BLOOD PRESSURE: 51 MMHG | SYSTOLIC BLOOD PRESSURE: 98 MMHG

## 2019-12-13 VITALS — DIASTOLIC BLOOD PRESSURE: 48 MMHG | SYSTOLIC BLOOD PRESSURE: 138 MMHG

## 2019-12-13 VITALS — SYSTOLIC BLOOD PRESSURE: 108 MMHG | DIASTOLIC BLOOD PRESSURE: 43 MMHG

## 2019-12-13 VITALS — SYSTOLIC BLOOD PRESSURE: 102 MMHG | DIASTOLIC BLOOD PRESSURE: 43 MMHG

## 2019-12-13 VITALS — SYSTOLIC BLOOD PRESSURE: 120 MMHG | DIASTOLIC BLOOD PRESSURE: 39 MMHG

## 2019-12-13 DIAGNOSIS — Z79.82: ICD-10-CM

## 2019-12-13 DIAGNOSIS — Z88.6: ICD-10-CM

## 2019-12-13 DIAGNOSIS — Z95.1: ICD-10-CM

## 2019-12-13 DIAGNOSIS — Z95.0: ICD-10-CM

## 2019-12-13 DIAGNOSIS — Z79.899: ICD-10-CM

## 2019-12-13 DIAGNOSIS — R41.9: ICD-10-CM

## 2019-12-13 DIAGNOSIS — I25.10: ICD-10-CM

## 2019-12-13 DIAGNOSIS — Z96.653: ICD-10-CM

## 2019-12-13 DIAGNOSIS — Z95.2: ICD-10-CM

## 2019-12-13 DIAGNOSIS — E43: ICD-10-CM

## 2019-12-13 DIAGNOSIS — D69.6: ICD-10-CM

## 2019-12-13 DIAGNOSIS — J90: ICD-10-CM

## 2019-12-13 DIAGNOSIS — Z88.1: ICD-10-CM

## 2019-12-13 DIAGNOSIS — R53.81: Primary | ICD-10-CM

## 2019-12-13 DIAGNOSIS — Z88.2: ICD-10-CM

## 2019-12-13 DIAGNOSIS — Z90.49: ICD-10-CM

## 2019-12-13 DIAGNOSIS — Z79.01: ICD-10-CM

## 2019-12-13 DIAGNOSIS — Z90.710: ICD-10-CM

## 2019-12-13 DIAGNOSIS — D72.829: ICD-10-CM

## 2019-12-13 DIAGNOSIS — I48.0: ICD-10-CM

## 2019-12-13 DIAGNOSIS — E78.5: ICD-10-CM

## 2019-12-13 DIAGNOSIS — Z88.8: ICD-10-CM

## 2019-12-13 DIAGNOSIS — E66.9: ICD-10-CM

## 2019-12-13 DIAGNOSIS — I10: ICD-10-CM

## 2019-12-13 DIAGNOSIS — D62: ICD-10-CM

## 2019-12-13 DIAGNOSIS — I35.0: ICD-10-CM

## 2019-12-13 LAB
ALBUMIN FLD-MCNC: 1.3 G/DL
AMYLASE FLD-CCNC: 23 U/L
BODY FLUID LDH: 231 IU/L
GLUCOSE FLD-MCNC: 116 MG/DL
HCT VFR BLD CALC: 24.5 % (ref 37–47)
HGB BLD-MCNC: 7.8 GM/DL (ref 12–15)
PROT FLD-MCNC: 2 G/DL

## 2019-12-13 PROCEDURE — 10112: CPT

## 2019-12-13 NOTE — PATH
[facility]
[address] [place]   [address]                     PATHOLOGY RPT PROCEDURE       
_______________________________________________________________________________
 
Name:       COGAN,JOAN S                  Room #:         239-P       ADM IN  
M.R.#:      [id number]     Account #:      [id number]  
Admission:  12/03/19    Date of Birth:  [birth year]  
Discharge:                                              Report #:    [id number]
                                                        Path Case #: [id number]
_______________________________________________________________________________
Note
LCA Accession Number: [id number]
   TESTS               RESULT  FLAG  UNITS    REF RANGE  LAB
------------------------------------------------------------
   Clinician Provided Cytology Information
   No. of containers..01 Other (Miscellaneous)
Source:                                                   01
   PLEURAL FLUID
DIAGNOSIS:                                                02
   PLEURAL FLUID
   NEGATIVE FOR MALIGNANT CELLS.
   THIS INTERPRETATION INCLUDES EVALUATION OF A CELL BLOCK.
   CELLULAR DEGENERATION IS PRESENT.
   REACTIVE MESOTHELIAL CELLS ARE PRESENT.
Pathologist ICD10:                                        02
   J90
Signed out by:                                            02
   Ada Sequeira MD, Pathologist
   NPI- [id number]
Performed by:                                             01
   Mamie Gallardo, Cytotechnologist ([facility])
Gross description:                                        01
   6 ML, ORANGE, CLOUDY
   /LCS  12/12/2019  1740 Local
 
------------------------------------------------------------
    FLAG LEGEND:
    L-Low Normal,H-High Normal,LL-Alert Low,HH-Alert High
    <-Panic Low,>-Panic High,A-Abnormal,AA-Critical Abnormal
------------------------------------------------------------
 
Performed at:
01 [address]
   [place]  [address]
   Ken Lyon MD, Phone: [phone]
[address]  [address]
   Ada Sequeira MD, Phone: [phone]
Specimen Comment: A courtesy copy of this report has been sent to [phone]
Specimen Comment: [id number]
Specimen Comment: Report sent to 
***Performed at:  01
   [address], [place], [place]  [id number]
   MD Ken Lyon MD Phone:  [phone]

## 2019-12-13 NOTE — PLAN
[facility]
[address]
[place]   [address]                     REHAB UNIT PLAN OF CARE       
_______________________________________________________________________________
 
Name:       COGAN,JOAN S                  Room #:         515-P       ADM IN  
M.R.#:      [id number]                       Account #:      [id number]  
Admission:  12/13/19    Attend Phys:    Roosevelt Lara MD 
Discharge:              Date of Birth:  [birth year]  
                                                          Report #: [id number]
                                                                    [id number]   
_______________________________________________________________________________
THIS REPORT FOR:   //name//                          
 
CC: Roosevelt Crews
 
DATE OF SERVICE:  12/16/2019
 
 
PROGRESS NOTE/OVERALL PLAN OF CARE
 
SUBJECTIVE:  The patient is seen back today in followup.  She is in no distress.
 She is pleasant.  Temperature 36.9, pulse 64, respirations 20, blood pressure
126/67.  Sternal incision is dressed.  Left pacemaker incision appears to be
healing well.  Lower extremities, no focal calf swelling.  Lower extremity
incisions appeared to be healing.  She is progressing in her therapies,
utilizing her sternal precautions.  Transfers are min assist.  Gait is contact
guard 100 feet without a device.  She started to work on stairs min assist x 4. 
In occupational therapy, lower body dressing is mod assist.  Upper body dressing
is min assist.
 
ASSESSMENT:  A 74-year-old white female with the following problem list:
1.  Medical complex with generalized debilitation.
2.  Cardiopulmonary rehabilitation.
3.  Coronary artery disease, status post coronary artery bypass graft x 6 on
12/03/2019.
4.  Status post permanent pacemaker on 12/10/2019.
5.  Status post tissue aortic valve replacement.
6.  Anemia.
7.  Effusion, status post thoracentesis.
8.  Hypertension.
9.  Prior history of bilateral total knee replacements.
10.  History of hyperlipidemia.
 
PLAN:  The overall plan of care is based on the preadmission screen,
post-admission physician evaluation and information garnered from therapy
assessments.
1.  Estimated length of stay is probably through the end of this week.  We will
need to see how she does.
2.  Medical prognosis is reasonably good.
3.  Anticipated interventions includes the interdisciplinary acute inpatient
rehabilitation program.
4.  Anticipated functional outcomes would be for the patient to become modified
independent with basic mobility and ADLs likely without gait aids.
5.  Discharge destination would be back to the home setting where she lives with
her .
6.  Expected therapy by discipline includes PT and OT 1-1/2 hours per day each
 
 
 
[address]   [address]                     REHAB UNIT PLAN OF CARE       
_______________________________________________________________________________
 
Name:       COGAN,JOAN S                  Room #:         515-P       [facility] [place]  
[facility]#:      [id number]                       Account #:      [id number]  
Admission:  12/13/19    Attend Phys:    Roosevelt Lara MD 
Discharge:              Date of Birth:  [birth year]  
                                                          Report #: [id number]
                                                                    [id number]   
_______________________________________________________________________________
five days a week throughout the duration of the acute inpatient rehabilitation
stay.
 
 
 
 
 
 
 
 
 
 
 
 
 
 
 
 
 
 
 
 
 
 
 
 
 
 
 
 
 
 
 
 
 
 
 
 
 
 
 
 
 
 
                         
   By:                               
                   
D: 12/16/19 0931                           _____________________________________
T: 12/16/19 1140                           Roosevelt Lara MD           /nt

## 2019-12-13 NOTE — NUR
chart review, cm visited pt while in icu waiting to be dc up to acute rehab.
pt was able to rock with holding heart pillow rt sternal precaution, 3 time
with and with gait belt to get up off bsc, and sit in recliner chair. call
light in pt hand. noted left high, thigh, upper leg with large about bruising.
ari is a & o x 3, pleasant and able to make her needs know. intro to cm, dcp,
and team meeting.  dr shine came to visit with pt as well. she reported she
was independent prior to hospital. live with  in house, 3 steps with 1
hand rail to enter home. drives, manage own medication. works on quilting out
of her garage with a big machine that she pushes and pulls, " wont for weeks
be able to do that"/ari. have cane, walker, bcs, grab bars in bathroom,
shower chair. daughter going to come from [facility] and stay with us for little
bit when go home. specialized hh in back and would use them aging if need hh.
my  can make or add some equip if i need it. thank you"/ari. end of
visit pt up in recliner chair, light in pt lap and report passed on to bedside
nurse. will cont following as needed for dc needs.

## 2019-12-13 NOTE — H
[facility]
Aleksandar [place]   [address]                     HISTORY AND PHYSICAL          
_______________________________________________________________________________
 
Name:       COGAN,JOAN S                  Room #:         515-P       ADM IN  
M.R.#:      [id number]                       Account #:      [id number]  
Admission:  12/13/19    Attend Phys:    Roosevelt Lara MD 
Discharge:              Date of Birth:  [birth year]  
                                                          Report #: [id number]
                                                                    [id number]   
_______________________________________________________________________________
THIS REPORT FOR:   //name//                          
 
CC: Roosevelt Crews
 
DATE OF SERVICE:  12/13/2019
 
 
HISTORY AND PHYSICAL AND POST-ADMISSION PHYSICIAN EVALUATION
 
HISTORY OF PRESENT ILLNESS:  The patient was originally admitted to [facility] on 12/03/2019 and underwent coronary artery bypass grafting x 6
for severe coronary artery disease with aortic valve replacement for aortic
stenosis.  She developed postoperative sick sinus syndrome and atrial
fibrillation and required a permanent pacemaker placed on 12/10/2019.  She also
developed a pleural effusion and had a right thoracentesis with 800 mL removed. 
She was tapered off of O2.  She was noted to have significant generalized
weakness with medical complexity and the need for cardiopulmonary
rehabilitation.  She has now been admitted for acute in-hospital inpatient
rehabilitation.  Please see the full history and physical including past medical
history, allergies, social history, and habits.
 
MEDICATIONS:  Please see the full medication listing.
 
REVIEW OF SYSTEMS:  Complains of overall generalized weakness.  No specific
chest pain, shortness of breath, abdominal discomfort.  She does have
significant bruising along her left lower extremity.
 
PHYSICAL EXAMINATION:
GENERAL:  The patient was seen earlier.  She is in no distress.
VITAL SIGNS:  Temperature 36.4, pulse 66, respirations 20, blood pressure
134/79.  She was in no distress.
HEAD, EYES, EARS, NOSE, AND THROAT:  Appeared to be benign.
NEUROLOGIC:  Cranial nerves 2-12 grossly intact.
CHEST:  Sounded clear to auscultation.  She does have the midline sternal
incision, which is dressed.  She also has upper left chest wall incision from
the pacemaker, which appears to be healing well.
CARDIOVASCULAR:  Sounded regular rate and rhythm.
ABDOMEN:  Bowel sounds positive, nontender.
GENITOURINARY AND RECTAL:  Deferred.
EXTREMITIES:  Functional level.  Gentle range of motion of the upper extremities
with limited testing of her distal upper extremities without obvious focal
weakness.  She does have the sternal precautions, which were respected.  Lower
extremities:  She has a large ecchymosis over the left lateral thigh.  There is
no focal calf swelling.  Strength of the lower extremities is probably at least
to 4- to 3+/5.  Tone appeared to be intact.  She is needing min assist for [address]   [address]                     HISTORY AND PHYSICAL          
_______________________________________________________________________________
 
Name:       COGAN,JOAN S                  Room #:         515-P       [facility] [place]  
..#:      [id number]                       Account #:      [id number]  
Admission:  12/13/19    Attend Phys:    Roosevelt Lara MD 
Discharge:              Date of Birth:  [birth year]  
                                                          Report #: [id number]
                                                                    [id number]   
_______________________________________________________________________________
and lower body dressing noted per OT.  She does have the sternal precautions as
previously noted.  She has been moderate assistance coming to stand.
 
ASSESSMENT:  A 74-year-old female with the following problem list:
1.  Medical complexity with generalized debilitation.
2.  Cardiopulmonary rehabilitation.
3.  Coronary artery disease, status post coronary artery bypass grafting x 6 on
12/03/2019.
4.  Status post permanent pacemaker on 12/10/2019.
5.  Status post tissue AVR.
6.  Anemia.
7.  Effusion, status post thoracentesis.
8.  Hypertension.
9.  Prior history of bilateral total knee replacements.
10.  History of hyperlipidemia.
 
PLAN:  The patient has been admitted for acute in-hospital inpatient
rehabilitation.  Again, please see the history and physical.  I agree with the
documentation, examination, assessment and plan as noted.  From a postadmission
physician evaluation perspective, there are no relevant changes since the
preadmission screening.  Please see the review of the prior and current medical
and functional conditions and comorbidities.  Please see the patient's previous
and current functional status.  As far as risk of complications, the patient has
multiple medical comorbidities as noted above.  Initial plan of care involves
the interdisciplinary acute inpatient rehabilitation program.  Medical
functional goals would be for the patient to become modified independent with
transfers, mobility, ADLs, so that she can hopefully return back to the home
setting.  Prognosis is reasonably good with estimated length of stay probably at
least 7-10 days.  Potential barriers would include her multiple medical
comorbidities and decreased functional status.
 
 
 
 
 
 
 
 
 
 
 
 
 
 
                         
   By:                               
                   
D: 12/14/19 1311                           _____________________________________
T: 12/14/19 1353                           Roosevelt Lara MD           /[facility]

## 2019-12-13 NOTE — HC
[facility]
Aleks[address]ar [address]
[place]   [address]                     CONSULTATION                  
_______________________________________________________________________________
 
Name:       COGAN,JOAN S                  Room #:         515-P       [facility] [place]  
M.R.#:      [id number]                       Account #:      [id number]  
Admission:  12/13/19    Attend Phys:    Roosevelt Lara MD 
Discharge:              Date of Birth:  [birth year]  
                                                          Report #: [id number]
                                                                    [id number]   
_______________________________________________________________________________
THIS REPORT FOR:   //name//                          
 
CC: Roosevelt Crews
 
DATE OF SERVICE:  12/16/2019
 
 
BEHAVIORAL STATUS EXAM
 
ATTENDING PHYSICIAN:  Roosevelt Lara MD
 
CONSULTANT:  Clark Kline, PhD
 
CLINICAL PRESENTATION:  The patient is a 74-year-old female admitted to the
rehab unit at [facility] for comprehensive inpatient
rehabilitation program to improve functional mobility, activities of daily
living and self-care and mental status secondary to deficits from medical
complexity and generalized debilitation.  The patient was initially admitted to
the hospital and required coronary artery bypass grafting x 6 from severe
coronary artery disease with aortic valve replacement for aortic stenosis.  The
patient developed a postoperative sick sinus syndrome with atrial fibrillation
and required placement of a permanent pacemaker.  The patient also developed
pleural effusion and had a right thoracentesis.  A complete description of her
medical condition and history along with medications can be found in her medical
record.  Neuropsychological consultation was requested to provide assistance in
the assessment of cognitive and emotional status and to provide recommendations
and services.
 
Prior to this most recent admission, the patient was living independently in her
own home.  She is  with 4 children.  The patient is a high school
graduate and attended business college.  She had worked as an  for
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  The patient also at this time
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  Her family is described as very
supportive.
 
TECHNIQUES UTILIZED:  Clinical interview, review of medical records, staff
consultation and behavioral observation, mini mental status exam 2 standard
version and clock drawing.
 
EXAMINATION FINDINGS:  The patient was alert and cooperative with the
assessment.  There is no evidence of aphasia.  She does not report auditory or
visual hallucinations.  There is no evidence of thought disorder.  She described
a longstanding difficulty with sleep.  Reduced appetite is also reported.  She
does not describe subjective anxiety or depression.  She also does not report
difficulty with memory or word finding.  The patient does acknowledge delirium
 
 
 
[facility]
[address] [place]   [address]                     CONSULTATION                  
_______________________________________________________________________________
 
Name:       COGAN,JOAN S                  Room #:         515-P       [facility] [place]  
..#:      [id number]                       Account #:      [id number]  
Admission:  12/13/19    Attend Phys:    Roosevelt Lara MD 
Discharge:              Date of Birth:  [birth year]  
                                                          Report #: [id number]
                                                                    [id number]   
_______________________________________________________________________________
associated with her ICU admission.
 
Her performance on the MMSE 2 brief version was in the mild range of impairment
with a raw score of 13/16.  She was 3/3 for initial registration, 5/5 for
orientation to time, 4/5 for orientation to place and 1/3 for immediate recall
of 3 items after a brief time delay and distraction.
 
Her performance improved on the MMSE 2 standard version to a raw score of 25/30,
T score of 42 and percentile rank of 21.  She was 3/5 for serial sevens, 2/2 for
naming, 1/1 for repetition.  Auditory comprehension, reading, writing and
copying a simple geometric design were within normal limits.
 
Clock drawing was generally within normal limits with a subtle issue and hand
placement was 5/2 rather than 10 after 11.
 
The patient is presenting with subtle-to-mild changes in cognitive status.  This
may reflect a mild cognitive disorder affecting primarily memory and
concentration.
 
DIAGNOSTIC IMPRESSION:  Mild neurocognitive disorder, unspecified, without
behavior disorder.
 
RECOMMENDATIONS:  She may benefit from continued use of compensatory strategies
to assist with concentration and memory.  A followup Neuropsych assessment may
be of benefit for the patient approximately 3-6 months after discharge to
clarify any underlying cognitive deficits that may remain as a result of
vascular disease.
 
Thank you very much for allowing me to provide the consultation on this patient.
 
 
 
 
 
 
 
 
 
 
 
 
 
 
 
                         
   By:                               
                   
D: 12/16/19 1742                           _____________________________________
T: 12/17/19 0139                           Clark Kline, PhD             /nt

## 2019-12-13 NOTE — EKG
[place] [address] [email or web address]
[place]  [address]
Phone:  [phone]                    ELECTROCARDIOGRAM REPORT      
_______________________________________________________________________________
 
Name:       COGAN,JOAN S                  Room #:         239-P       ADM IN  
M.R.#:      [id number]     Account #:      [id number]  
Admission:  19    Attend Phys:    Subhash Cordova MD    
Discharge:              Date of Birth:  [birth year]  
                                                          Report #: [id number]
   [id number]
_______________________________________________________________________________
THIS REPORT FOR:   //name//                          
 
                          [facility]
                                       
Test Date:    2019               Test Time:    07:01:40
Pat Name:     JOAN COGAN               Department:   
Patient ID:   [id number]            Room:         239 P
Gender:       F                        Technician:   EDWIN BISWAS
:          1945               Requested By: Roosevelt Mendes
Order Number: [id number] MD:   Otilio Carvajal
                                 Measurements
Intervals                              Axis          
Rate:         65                       P:            
NJ:           172                      QRS:          21
QRSD:         165                      T:            161
QT:           506                                    
QTc:          527                                    
                           Interpretive Statements
Ventricular-paced complexes
No further analysis attempted due to paced rhythm
Compared to ECG 2019 07:14:20
No significant change was found
Electronically Signed On 2019 8:02:17 CST by Otilio Carvajal
[email or web address]
 
 
 
 
 
 
 
 
 
 
 
 
 
 
 
 
 
 
 
  <ELECTRONICALLY SIGNED>
   By: Otilio Carvajal MD, [facility]   
  19
D: 19                           _____________________________________
T: 19                           Otilio Carvajal MD, [facility]     /EPI

## 2019-12-13 NOTE — NUR
ASSESSMENTS AND INTERVENTIONS AS DOCCUMENTED. PATIENT WORKING WITH PT AND OT.
FAMILY AT BED SIDE. PATIENT UPDATED ABOUT POC. DR. LYNN ROUNDED. PATIENT TO
DISCHARGE TO REHAB.

## 2019-12-14 VITALS — SYSTOLIC BLOOD PRESSURE: 115 MMHG | DIASTOLIC BLOOD PRESSURE: 58 MMHG

## 2019-12-14 LAB
ANION GAP SERPL CALC-SCNC: 6 MMOL/L (ref 7–16)
BUN SERPL-MCNC: 14 MG/DL (ref 7–18)
CALCIUM SERPL-MCNC: 8.2 MG/DL (ref 8.5–10.1)
CHLORIDE SERPL-SCNC: 105 MMOL/L (ref 98–107)
CO2 SERPL-SCNC: 29 MMOL/L (ref 21–32)
CREAT SERPL-MCNC: 0.6 MG/DL (ref 0.6–1)
ERYTHROCYTE [DISTWIDTH] IN BLOOD BY AUTOMATED COUNT: 20.6 % (ref 10.5–14.5)
GLUCOSE SERPL-MCNC: 88 MG/DL (ref 74–106)
HCT VFR BLD CALC: 23.4 % (ref 37–47)
HGB BLD-MCNC: 7.4 GM/DL (ref 12–15)
MCH RBC QN AUTO: 30.2 PG (ref 26–34)
MCHC RBC AUTO-ENTMCNC: 31.7 G/DL (ref 28–37)
MCV RBC: 95.3 FL (ref 80–100)
PLATELET # BLD: 177 THOU/UL (ref 150–400)
POTASSIUM SERPL-SCNC: 3.6 MMOL/L (ref 3.5–5.1)
RBC # BLD AUTO: 2.45 MIL/UL (ref 4.2–5)
SODIUM SERPL-SCNC: 140 MMOL/L (ref 136–145)
WBC # BLD AUTO: 12.6 THOU/UL (ref 4–11)

## 2019-12-14 NOTE — NUR
ASSUMED CARE AT 0700, PATIENT ALERT AND ORIENTED X 4, NO ACUTE DISTRESS NOTED.
CONDITION STABLE, VS STABLE, PATIENT  DENIED ANY PAIN OR DISCOMFORT DURING
SHIFT.  STERNAL PRECAUTIONS CONTINUED, UP WITH ASSIST X 1 AND STAND BY ASSIST.
NO IV, DRESSING TO CHEST, CLEAN DRY AND INTACT. CONTINENT, BM TODAY 12/14/19,
ABLE TO VOIT WITH NO ISSUES.  PATIENT ABLE TO TOLERATE PT AND OT, FAMILY
VISITED THROUGHOUT THE DAY.  BED IN LOWEST POSITION, CALL LIGHT WITHIN REACH,
WWILL CONTINUE TO MONITOR AS PER POC.

## 2019-12-14 NOTE — NUR
PT ARRIVED TO UNIT APPROX 2030 VIA W/C WITH FAMILY. PT ALERT AND ORIENTED X4,
APPROPRIATE AND COOPERATIVE STATING SHE WANTS TO DO THERAPY AND BE FINISHED
AND DISCHARGED SOON. PT ORIENTED TO ROOM AND REHAB PLAN OF CARE. PT USING
PILLOW AND STERNAL PRECAUTIONS. VSS. ASSISTED PT TO BATHROOM STANDBY ASSIST
BEFORE HS. PT TOOK HS MEDS WITH WATER TOLERATING WELL. PT STATED SHE WAS TIRED
AND READY TO GO TO SLEEP. FAMILY HERE FOR SHORT TIME SUPPORTIVE OF PT. PT
DENIED PAIN. DRESSING TO CHEST C/D/I. BED ALARM ON AND CALL LIGHT IN REACH.
WILL CONTINUE TO MONITOR.

## 2019-12-15 VITALS — SYSTOLIC BLOOD PRESSURE: 104 MMHG | DIASTOLIC BLOOD PRESSURE: 53 MMHG

## 2019-12-15 VITALS — DIASTOLIC BLOOD PRESSURE: 67 MMHG | SYSTOLIC BLOOD PRESSURE: 126 MMHG

## 2019-12-15 VITALS — DIASTOLIC BLOOD PRESSURE: 49 MMHG | SYSTOLIC BLOOD PRESSURE: 117 MMHG

## 2019-12-15 NOTE — NUR
ASSUMED CARE OF PT AT 0715. PT IS A&OX4 AND VITAL SIGNS ARE STABLE. MIDLINE
INCISION DRESSING C/D/I, LLE GRAFT SITE DRESSING C/D/I. SURGICAL INCISION TO
LEFT SUBCLAVICULAR AREA WELL APPROXIMATED, MINIMAL EDEMA AND REDNESS, NO
DRAINAGE. STERNAL PRECAUTIONS IN PLACE AND PT USING HEART PILLOW
APPROPRIATELY. PT UP WITH STANDBY ASSISTANCE USING GAIT BELT. LUNG SOUNDS
CLEAR BILATERALLY IN ALL LOBES, PT USING IS APPROPRIATELY. PRIOR SHIFT REPORT
STATED THAT PT HAD DIZZINESS, PT DENIES ANY AT THIS TIME. PT DENIES PAIN AND
PARTICIPATED IN SCHEDULED THERAPIES. CALLS APPROPRIATELY. FALL PRECAUTIONS IN
PLACE AND NURSING WILL CONTINUE TO MONITOR.

## 2019-12-15 NOTE — NUR
PATIENT NOTED EDEMA PRESENT LEFT LEG AND PINK TINGE TO URINE. SLEEPING ON BACK
AND USING HEART SHAPED PILLOW TO COUGH. WORKING TO INCREASE I.S. TO PRE-OP
LEVEL. CALLS FOR ASSIST UP TO BATHROOM, HAS STEADY GAIT AND VERY LITTLE
DIZZINESS. PLEASANT, SURGERIES REVIEWED AND INFORMED OF WHAT TO EXPECT DURING
CARDIAC REHAB. PACEMAKER SITE IS DRY AND INTACT. ENCOURAGED TO DECREASE SALT
IN DIET, SHE ALREADY PAYS CLOSE ATTENTION TO WHAT SHE EATS IN KEEPING DIET
GLUTEN-FREE.

## 2019-12-16 VITALS — SYSTOLIC BLOOD PRESSURE: 120 MMHG | DIASTOLIC BLOOD PRESSURE: 62 MMHG

## 2019-12-16 VITALS — DIASTOLIC BLOOD PRESSURE: 51 MMHG | SYSTOLIC BLOOD PRESSURE: 119 MMHG

## 2019-12-16 NOTE — NUR
Nutrition: pt admit to rehab unit on 12/13 S/P CABG x 6, pacemeaker placement,
thoracentesis on acute. RD followed pt on acute. Follows gluten free diet due
to celiac bx a year ago and feels better when avoids. Dtr brings in food
frequently and pt drinks 1-2 premier protein drinks from home daily due to not
a big meat eater. Able to order meals and intake is fair. RD entered food
preferences. Prior severe weight loss from [facility]/one year however wts trending
up > 20# over admit. Partially related to fluid. Severe malnutrition doc per
physician-agree and defer. Pt set to D/C in 2 days. Place as low risk with
interventions in place.

## 2019-12-16 NOTE — NUR
FAXED REFERRAL TO [facility] SPOKE WITH ALONDRA IN INTAKE SHE RECEIVED
REFERRAL AND CAN ACCEPT AT [place]. DP TO FOLLOW.

## 2019-12-16 NOTE — NUR
ASSUMED CARE OF PT AT 0715. PT IS A&OX4 AND VITAL SIGNS ARE STABLE. PT REPORTS
DESIRE TO DISCHARGE BY THURSDAY, DR. GODINEZ NOTIFIED OF REQUEST. PER
LUPE GEE PT MAY SHOWER AS NEEDED. DRESSIGS TO BE REMOVED PRIOR TO
SHOWER AND NURSING TO PLACE 4-SIDED DRESSING TO MIDLINE STERNAL SURGAICAL
SITE. LEAVE OTHER SITES GUZMAN. PT REPORTED POOR SLEEP OVERNIGHT AND ORDERS
RECEIVED FOR MELATONIN AT BEDTIME. PT IS ON STERNAL PRECATIONS AND IS MANAGING
PRECAUTIONS INDEPENDENTLY. DENIES PAIN AND PARTICIPATED IN SCHEDULED
THERAPIES. CALLS APPROPRIATELY, FALL PRECAUTIONS IN PLACE AND NURSING WILL
CONTINUE TO MONITOR.

## 2019-12-16 NOTE — NUR
PT ASSESSMENT COMPLETED AND VSS. MEDS GIVEN AS ORDERED AND WELL TOLERATED.
FALL PRECAUTIONS IN PLACE. UP TO THE BATHROOM WITH ASST/GAIT. STEADY. DSGS ON
CHEST/ABD AND LEFT LEG DRY AND INTACT. BRUISING ON ABD/GROIN/AND LEFT LEG
REMAIN. VOIDING MODERATE AMOUNT OF YELLOW URINE. SLEEPING WELL. WILL CONTINUE
TO MONITOR FREQUENTLY.

## 2019-12-17 VITALS — SYSTOLIC BLOOD PRESSURE: 122 MMHG | DIASTOLIC BLOOD PRESSURE: 46 MMHG

## 2019-12-17 VITALS — DIASTOLIC BLOOD PRESSURE: 56 MMHG | SYSTOLIC BLOOD PRESSURE: 122 MMHG

## 2019-12-17 NOTE — NUR
ASSUMED CARE OF PT AT APPROX 0700. PT IS ALERT AND ORIENTED, ASSESSMENT AS
CHARTED. DENIES PAIN. WORKS WELL WITH THERAPY. DENIES QUESTIONS OR CONCERNS.
SHOWERED TODAY WITH PT. NEW STERNAL DRESSING PLACED BY THIS RN. NAD NOTED THIS
SHIFT. PT TO BE DISCHARGED TOMORROW. MAKING GREAT PROGRESS TOWARDS POC GOALS.

## 2019-12-17 NOTE — NUR
team meeting, recommendation: [facility] (pt, ot, nursing) [facility],
sternal precaution training with  and medication at home.

## 2019-12-17 NOTE — NUR
I.S. HOURLY WHEN AWAKE, LONG-TERM GOAL OF [address], ABOUT skilled nursing THERE. STEADY
GAIT TO BATHROOM WITH STANDBY ASSIST AND GAIT BELT. DENIES PAIN, KEEPING HEART
PILLOW IN REACH FOR COUGHING

## 2019-12-17 NOTE — NUR
DISCHARGE PLANNING.  ANTICIPATED DISCHARGE PLANNED FOR 12/18. PATIENT REFERRAL
FAXED TO [facility].  CALL PLACED TO [facility] TO NOTIFY. [facility]
CURRENTLY ON UNIT DOING BEDSIDE CONSULTATION. ACCEPTING OF PATIENT AT
DISCHARGE.  FOLLOWING.

## 2019-12-18 VITALS — DIASTOLIC BLOOD PRESSURE: 51 MMHG | SYSTOLIC BLOOD PRESSURE: 112 MMHG

## 2019-12-18 VITALS — SYSTOLIC BLOOD PRESSURE: 112 MMHG | DIASTOLIC BLOOD PRESSURE: 51 MMHG

## 2019-12-18 NOTE — NUR
ASSUMED CARE AT 0700, PATIENT A&O X 4, NO ACUTE DISTRESS. CONDITION STABLE, VS
STABLE.  PATIENT DENIED ANY PAIN OR DISCOMFORT DURING SHIFT.  NO IV. RESIDENT
DISCHARGED AT 1505, EDUCATED ON MEDICATION ADMINISTRATION, CONTINUATION OF
STERNAL PRECAUTIONS AND FOLLOW UP APPOINTMENTS.  STERNAL WOUND COVERED WITH 4
SIDED DRESSING.  PATIENT TRANSPORTED OUT OF FACILITY VIA WHEELCHAIR WITH
FAMILY.

## 2019-12-18 NOTE — NUR
assumed care at approx 1900 evening 12/17. pt alert and oriented x4, pleasant
and cooperative. pt glad she is being discharged Wed. pt up with 1 to bathroom
to void using pillow for sternal precautions. pt took hs meds with water
tolerating well. pt appears to be sleeping soundly with hourly rounding
checks. bed alarm on and call light in reach. will continue to monitor.

## 2020-05-18 ENCOUNTER — HOSPITAL ENCOUNTER (OUTPATIENT)
Dept: HOSPITAL 96 - M.LAB | Age: 75
End: 2020-05-18
Attending: NURSE PRACTITIONER
Payer: MEDICARE

## 2020-05-18 DIAGNOSIS — E78.49: Primary | ICD-10-CM

## 2020-05-18 LAB
CHOLEST SERPL-MCNC: 139 MG/DL (ref ?–200)
HDLC SERPL-MCNC: 58 MG/DL (ref 40–?)
LDLC SERPL-MCNC: 69 MG/DL (ref ?–100)
TC:HDL: 2.4 RATIO
TRIGL SERPL-MCNC: 61 MG/DL (ref ?–150)
VLDLC SERPL CALC-MCNC: 12 MG/DL (ref ?–40)

## 2020-05-19 LAB
ALBUMIN SERPL-MCNC: 4.1 G/DL (ref 3.4–5)
ALP SERPL-CCNC: 51 U/L (ref 46–116)
ALT SERPL-CCNC: 36 U/L (ref 30–65)
AST SERPL-CCNC: 29 U/L (ref 15–37)
BILIRUB DIRECT SERPL-MCNC: 0.2 MG/DL
BILIRUB SERPL-MCNC: 0.8 MG/DL
CK-MB MASS: 2 NG/ML
PROT SERPL-MCNC: 7.3 G/DL (ref 6.4–8.2)

## 2020-06-01 ENCOUNTER — HOSPITAL ENCOUNTER (OUTPATIENT)
Dept: HOSPITAL 96 - M.LAB | Age: 75
End: 2020-06-01
Attending: NURSE PRACTITIONER
Payer: MEDICARE

## 2020-06-01 DIAGNOSIS — Z79.899: ICD-10-CM

## 2020-06-01 DIAGNOSIS — I25.119: Primary | ICD-10-CM

## 2020-11-12 ENCOUNTER — HOSPITAL ENCOUNTER (INPATIENT)
Dept: HOSPITAL 96 - M.ERS | Age: 75
LOS: 5 days | Discharge: HOME | DRG: 149 | End: 2020-11-17
Attending: INTERNAL MEDICINE | Admitting: INTERNAL MEDICINE
Payer: MEDICARE

## 2020-11-12 VITALS — SYSTOLIC BLOOD PRESSURE: 157 MMHG | DIASTOLIC BLOOD PRESSURE: 75 MMHG

## 2020-11-12 VITALS — SYSTOLIC BLOOD PRESSURE: 148 MMHG | DIASTOLIC BLOOD PRESSURE: 61 MMHG

## 2020-11-12 VITALS — SYSTOLIC BLOOD PRESSURE: 1850 MMHG | DIASTOLIC BLOOD PRESSURE: 59 MMHG

## 2020-11-12 VITALS — SYSTOLIC BLOOD PRESSURE: 135 MMHG | DIASTOLIC BLOOD PRESSURE: 60 MMHG

## 2020-11-12 VITALS — BODY MASS INDEX: 27.7 KG/M2 | HEIGHT: 60.98 IN | WEIGHT: 146.7 LBS

## 2020-11-12 DIAGNOSIS — Z88.8: ICD-10-CM

## 2020-11-12 DIAGNOSIS — Z95.1: ICD-10-CM

## 2020-11-12 DIAGNOSIS — Z79.899: ICD-10-CM

## 2020-11-12 DIAGNOSIS — Z79.82: ICD-10-CM

## 2020-11-12 DIAGNOSIS — I25.10: ICD-10-CM

## 2020-11-12 DIAGNOSIS — Z88.5: ICD-10-CM

## 2020-11-12 DIAGNOSIS — I08.0: ICD-10-CM

## 2020-11-12 DIAGNOSIS — Z95.0: ICD-10-CM

## 2020-11-12 DIAGNOSIS — E11.9: ICD-10-CM

## 2020-11-12 DIAGNOSIS — Z88.1: ICD-10-CM

## 2020-11-12 DIAGNOSIS — Z90.710: ICD-10-CM

## 2020-11-12 DIAGNOSIS — E78.5: ICD-10-CM

## 2020-11-12 DIAGNOSIS — I10: ICD-10-CM

## 2020-11-12 DIAGNOSIS — Z88.0: ICD-10-CM

## 2020-11-12 DIAGNOSIS — Z96.653: ICD-10-CM

## 2020-11-12 DIAGNOSIS — I48.0: ICD-10-CM

## 2020-11-12 DIAGNOSIS — I65.23: ICD-10-CM

## 2020-11-12 DIAGNOSIS — Z20.828: ICD-10-CM

## 2020-11-12 DIAGNOSIS — H81.10: Primary | ICD-10-CM

## 2020-11-12 LAB
ABSOLUTE BASOPHILS: 0 THOU/UL (ref 0–0.2)
ABSOLUTE EOSINOPHILS: 0.1 THOU/UL (ref 0–0.7)
ABSOLUTE MONOCYTES: 0.4 THOU/UL (ref 0–1.2)
ALBUMIN SERPL-MCNC: 3.6 G/DL (ref 3.4–5)
ALP SERPL-CCNC: 46 U/L (ref 46–116)
ALT SERPL-CCNC: 24 U/L (ref 30–65)
ANION GAP SERPL CALC-SCNC: 6 MMOL/L (ref 7–16)
AST SERPL-CCNC: 21 U/L (ref 15–37)
BASOPHILS NFR BLD AUTO: 0.3 %
BILIRUB SERPL-MCNC: 1 MG/DL
BILIRUB UR-MCNC: NEGATIVE MG/DL
BUN SERPL-MCNC: 20 MG/DL (ref 7–18)
CALCIUM SERPL-MCNC: 8.4 MG/DL (ref 8.5–10.1)
CHLORIDE SERPL-SCNC: 105 MMOL/L (ref 98–107)
CO2 SERPL-SCNC: 30 MMOL/L (ref 21–32)
COLOR UR: YELLOW
CREAT SERPL-MCNC: 0.6 MG/DL (ref 0.6–1.3)
EOSINOPHIL NFR BLD: 0.9 %
GLUCOSE SERPL-MCNC: 117 MG/DL (ref 70–99)
GRANULOCYTES NFR BLD MANUAL: 79.2 %
HCT VFR BLD CALC: 43.4 % (ref 37–47)
HGB BLD-MCNC: 14.3 GM/DL (ref 12–15)
KETONES UR STRIP-MCNC: NEGATIVE MG/DL
LYMPHOCYTES # BLD: 0.9 THOU/UL (ref 0.8–5.3)
LYMPHOCYTES NFR BLD AUTO: 13.5 %
MCH RBC QN AUTO: 29.7 PG (ref 26–34)
MCHC RBC AUTO-ENTMCNC: 32.8 G/DL (ref 28–37)
MCV RBC: 90.6 FL (ref 80–100)
MONOCYTES NFR BLD: 6.1 %
MPV: 10.6 FL. (ref 7.2–11.1)
NEUTROPHILS # BLD: 5.1 THOU/UL (ref 1.6–8.1)
NUCLEATED RBCS: 0 /100WBC
PLATELET COUNT*: 90 THOU/UL (ref 150–400)
POTASSIUM SERPL-SCNC: 3.4 MMOL/L (ref 3.5–5.1)
PROT SERPL-MCNC: 7.1 G/DL (ref 6.4–8.2)
PROT UR QL STRIP: NEGATIVE
RBC # BLD AUTO: 4.8 MIL/UL (ref 4.2–5)
RBC # UR STRIP: NEGATIVE /UL
RDW-CV: 13.4 % (ref 10.5–14.5)
SODIUM SERPL-SCNC: 141 MMOL/L (ref 136–145)
SP GR UR STRIP: 1.02 (ref 1–1.03)
URINE CLARITY: CLEAR
URINE GLUCOSE-RANDOM: NEGATIVE
URINE LEUKOCYTES-REFLEX: NEGATIVE
URINE NITRITE-REFLEX: NEGATIVE
UROBILINOGEN UR STRIP-ACNC: 0.2 E.U./DL (ref 0.2–1)
WBC # BLD AUTO: 6.4 THOU/UL (ref 4–11)

## 2020-11-12 NOTE — EKG
[place] [address]
Phone:  [phone]                     ELECTROCARDIOGRAM REPORT      
_______________________________________________________________________________
 
Name:         COGAN,JOAN S                  Room:          [address]#:    [id number]     Account #:     [id number]  
Admission:    20    Attend Phys:   Jannie Cruz, 
Discharge:                Date of Birth: [birth year]  
Date of Service: 20 0602  Report #:      [id number]
        [id number]
_______________________________________________________________________________
THIS REPORT FOR:  //name//                      
 
                         [facility] ED
                                       
Test Date:    2020               Test Time:    06:02:52
Pat Name:     JOAN COGAN               Department:   
Patient ID:   [id number]            Room:         [facility]
Gender:       F                        Technician:   ANJALI
:          1945               Requested By: Tiffanie Vega
Order Number: [id number] MD:   Subhash Pichardo
                                 Measurements
Intervals                              Axis          
Rate:         60                       P:            59
OH:           173                      QRS:          36
QRSD:         154                      T:            237
QT:           516                                    
QTc:          516                                    
                           Interpretive Statements
Sinus rhythm
Left bundle branch block
Compared to ECG 2019 15:33:26
No significant changes
Electronically Signed On 2020 16:28:03 CST by Subhash Pichardo
[email or web address]
 
 
 
 
 
 
 
 
 
 
 
 
 
 
 
 
 
 
 
 
  <ELECTRONICALLY SIGNED>
                                           By: Subhash Pichardo MD, [facility]     
  20     1628
D: 20 0602   _____________________________________
T: 20 0602   Subhash Pichardo MD, [facility]       /EPI

## 2020-11-12 NOTE — NUR
PT. ADMITTED TO FLOOR FROM ER ABOUT 1500. AOX4, VSS, DENIES DIZZINESS OR CHEST
PAIN, ADMISSION COMPLETED. CALL LIGHT AND PERSONAL BELONGINGS PLACED WITHIN
REACH. LAMAR CATH PATENT AND DRAINING CLEAR YELLOW URINE. PT. IN BED, RESTING
, IN NO APPARENT DISTRESS, AT SHIFT CHANGE.

## 2020-11-13 VITALS — DIASTOLIC BLOOD PRESSURE: 55 MMHG | SYSTOLIC BLOOD PRESSURE: 143 MMHG

## 2020-11-13 VITALS — DIASTOLIC BLOOD PRESSURE: 48 MMHG | SYSTOLIC BLOOD PRESSURE: 139 MMHG

## 2020-11-13 VITALS — SYSTOLIC BLOOD PRESSURE: 121 MMHG | DIASTOLIC BLOOD PRESSURE: 40 MMHG

## 2020-11-13 VITALS — SYSTOLIC BLOOD PRESSURE: 130 MMHG | DIASTOLIC BLOOD PRESSURE: 57 MMHG

## 2020-11-13 VITALS — SYSTOLIC BLOOD PRESSURE: 147 MMHG | DIASTOLIC BLOOD PRESSURE: 44 MMHG

## 2020-11-13 VITALS — DIASTOLIC BLOOD PRESSURE: 53 MMHG | SYSTOLIC BLOOD PRESSURE: 131 MMHG

## 2020-11-13 NOTE — NUR
ASSUMED CARE OF PT AFTER REPORT AT 1930. PT A&OX4. VSS. PHYSICAL ASSESSMENT
COMPLETED AND CHARTED. PT ON RA. PT TRACING AVPACED ON TELE. PT UPSTANDBY. PT
STILL WITH EPISODE OF DIZZINESS. PT DENIES ANY PAIN. FALL PRECAUTIONS IN
PLACE. CALL LIGHT WITHIN REACH.

## 2020-11-13 NOTE — NUR
CM SPOKE TO THE PT TO DISCUSS CM ASSESSMENT. PT A&O, INDEPENDENT WITH ADL'S,
AND ACTIVE. PT INFORMS THAT SHE DOES NOT DRIVE, AS SHE HAS 'VISION PROBLEMS'.
PT RESIDES AT HOME WITH SPOUSE AND HE DOES ALL DRIVING. PT OWNS A WALKER AND A
CANE FROM PREVIOUS SX, BUT DOES NOT USE THEM. PT HAS PAST HX OF HH AFTER SX.
PT HAS 0 SNF HX. NO D/C PLANNING NEEDS ANTICIPATED. CM WILL REMAIN AVAILABLE
TO ASSIST AND FOLLOW AS NEEDED.

## 2020-11-13 NOTE — 2DMMODE
[place] [address]
Phone:  [phone] 2 D/M-MODE ECHOCARDIOGRAM     
_______________________________________________________________________________
 
Name:         COGAN,JOAN S                  Room:          [address]
ZAN#:    [id number]     Account #:     [id number]  
Admission:    20    Attend Phys:   Jannie Cruz, 
Discharge:                Date of Birth: [birth year]  
Date of Service: 20 1405  Report #:      [id number]
        [id number]
_______________________________________________________________________________
THIS REPORT FOR:
 
cc:  Subhash Crews John E. DO Holkins, John M. MD [facility]       
                                                                       ~
 
--------------- APPROVED REPORT --------------
 
 
Study performed:  2020 11:32:08
 
EXAM: Comprehensive 2D, Doppler, and color-flow 
Echocardiogram 
Patient Location: In-Patient   
Room #:  [facility]     Status:  routine
 
      BSA:         1.62
HR: 60 bpm BP:          130/57 mmHg 
Rhythm: NSR     
 
Other Information 
Study Quality: Good
 
Indications
dizziness
 
2D Dimensions
IVSd:  14.32 (7-11mm) LVOT Diam:  18.79 (18-24mm) 
LVDd:  35.02 mm  
PWd:  9.76 (7-11mm) Ascending Ao:  20.81 (22-36mm)
LVDs:  20.65 (25-40mm) 
Aortic Root:  21.67 mm 
 
Volumes
Left Atrial Volume (Systole) 
    LA ESV Index:  49.50 mL/m2
 
Aortic Valve
AoV Peak Reyes.:  2.87 m/s 
AO Peak Gr.:  33.03 mmHg LVOT Max P.75 mmHg
AO Mean Gr.:  20.76 mmHg LVOT Mean P.60 mmHg
    LVOT Max V:  0.83 m/s
AO V2 VTI:  57.49 cm  LVOT Mean V:  0.59 m/s
ANDREA (VTI):  1.01 cm2  LVOT V1 VTI:  20.86 cm
 
 
 
[place] [address]
Phone:  [phone]                     2 D/M-MODE ECHOCARDIOGRAM     
_______________________________________________________________________________
 
Name:         COGAN,JOAN S                  Room:          [address]
M.R.#:    [id number]     Account #:     [id number]  
Admission:    20    Attend Phys:   Jannie Cruz, 
Discharge:                Date of Birth: [birth year]  
Date of Service: 20 1405  Report #:      [id number]
        [id number]
_______________________________________________________________________________
Mitral Valve
    E/A Ratio:  1.07
    MV Decel. Time:  211.34 ms
MV E Max Reyes.:  1.11 m/s 
MV PHT:  61.29 ms  
MVA (PHT):  3.59 cm2  
 
TDI
E/Lateral E':  11.10 E/Medial E':  22.20
   Medial E' Reyes.:  0.05 m/s
   Lateral E' Reyes.:  0.10 m/s
 
Pulmonary Valve
PV Peak Reyes.:  0.96 m/s PV Peak Gr.:  3.70 mmHg
 
Tricuspid Valve
    RAP Estimate:  5.00 mmHg
TR Peak Gr.:  19.89 mmHg RVSP:  24.00 mmHg
    PA Pressure:  24.00 mmHg
 
Left Ventricle
The left ventricle is normal size. There is normal LV segmental wall 
motion. Mild to moderate concentric left ventricular hypertrophy. 
Left ventricular systolic function is normal. The left ventricular 
ejection fraction is within the normal range. LVEF is 55-60%. The 
left ventricular diastolic function is normal.
 
Right Ventricle
Right ventricle is borderline dilated. The right ventricular systolic 
function is normal. Pacemaker lead is present in the right ventricle. 
 
Atria
Left atrium is moderately dilated. Right atrium is mildly 
dilated.
 
Aortic Valve
Mild aortic valve sclerosis. Bioprosthetic aortic valve is present. 
Mild aortic regurgitation. Mild aortic stenosis.
 
Mitral Valve
Moderate mitral annular calcification. Mild mitral regurgitation. No 
evidence of mitral valve stenosis.
 
Tricuspid Valve
The tricuspid valve is normal in structure. Mild tricuspid 
regurgitation. No pulmonary hypertension.
 
 
[place] [address]
Phone:  [phone]                     2 D/M-MODE ECHOCARDIOGRAM     
_______________________________________________________________________________
 
Name:         COGAN,JOAN S                  Room:          [address]#:    [id number]     Account #:     [id number]  
Admission:    20    Attend Phys:   Jannie Cruz, 
Discharge:                Date of Birth: [birth year]  
Date of Service: 20 1405  Report #:      [id number]
        [id number]
_______________________________________________________________________________
 
Pulmonic Valve
The pulmonary valve is normal in structure. Mild pulmonic 
regurgitation.
 
Great Vessels
The aortic root is normal in size. IVC is normal in size and 
collapses >50% with inspiration.
 
Pericardium
There is no pericardial effusion.
 
<Conclusion>
The left ventricle is normal size.
Mild to moderate concentric left ventricular hypertrophy.
Left ventricular systolic function is normal.
The left ventricular ejection fraction is within the normal 
range.
LVEF is 55-60%.
Right ventricle is borderline dilated.
Left atrium is moderately dilated.
Right atrium is mildly dilated.
Mild aortic valve sclerosis.
Mild aortic regurgitation.
Mild aortic stenosis.
Moderate mitral annular calcification.
Mild mitral regurgitation.
No evidence of mitral valve stenosis.
The tricuspid valve is normal in structure.
Mild tricuspid regurgitation.
No pulmonary hypertension.
IVC is normal in size and collapses >50% with inspiration.
There is no pericardial effusion.
There is normal LV segmental wall motion.
Pacemaker lead is present in the right ventricle.
Bioprosthetic aortic valve is present.
 
 
 
 
 
 
 
 
  <ELECTRONICALLY SIGNED>
                                           By: Subhash Pichardo MD, FACC     
  20     1405
D: 20 1405   _____________________________________
T: 20 1405   Subhash Pichardo MD, FACC       /INF

## 2020-11-13 NOTE — NUR
ASSUMED CARE OF PT AT 0730. PT RESTING IN BED. A&0X4, DENIES ANY PAIN OR
SHORTNESS OF BREATH AT THIS TIME. PT STATES SHE HAS DIZZINESS AT REST AND WITH
EXERTION AND FEELS "FUNNY IN HER HEAD". PT TRACING AV PACED ON THE CARDIAC
MONITOR. MURMUR NOTED. ON RA SAT 97%. PT UP WITH SBA TO BATHROOM. PT STARTED
ON MECLIZINE Q6H SCHEDULED. REFER TO EMAR. PT GOAL FOR TODAY IS COMPLETE MRI
AS PACEMAKER COMPATIBLE PER CARDIOLOGY, ECHO, NEURO AND CARD WORK UP. AM
ASSESSMENT AS CHARTED. MEDS PER MAR. PT REPOSITIONS SELF. HOURLY ROUNDING
OBSERVED. BED IN LOW POSITION. CALL LIGHT WITHIN REACH. WILL CONTINUE PLAN OF
CARE.

## 2020-11-14 VITALS — SYSTOLIC BLOOD PRESSURE: 151 MMHG | DIASTOLIC BLOOD PRESSURE: 56 MMHG

## 2020-11-14 VITALS — DIASTOLIC BLOOD PRESSURE: 55 MMHG | SYSTOLIC BLOOD PRESSURE: 156 MMHG

## 2020-11-14 VITALS — DIASTOLIC BLOOD PRESSURE: 69 MMHG | SYSTOLIC BLOOD PRESSURE: 165 MMHG

## 2020-11-14 VITALS — DIASTOLIC BLOOD PRESSURE: 57 MMHG | SYSTOLIC BLOOD PRESSURE: 148 MMHG

## 2020-11-14 VITALS — DIASTOLIC BLOOD PRESSURE: 69 MMHG | SYSTOLIC BLOOD PRESSURE: 158 MMHG

## 2020-11-14 VITALS — DIASTOLIC BLOOD PRESSURE: 60 MMHG | SYSTOLIC BLOOD PRESSURE: 149 MMHG

## 2020-11-14 NOTE — NUR
ASSUMED CARE OF PT AT 0730.  PT RESTING IN BED WAITING FOR BREAKFAST. A&0X4,
DENIES ANY PAIN OR SHORTNESS OF BREATH AT THIS TIME. PT STATES SHE STILL HAS
DIZZINESS WITH REST AND EXERTION. PT AMBULATES TO BATHROOM WITH 1 ASSIST- PT
UNSTEADY AND FALLS TO SIDE STATING "THE WALL LOOKS SIDEWAYS" REINFORCEMENT
GIVEN TO PT TO CALL FOR ASSIST. BED ALARM IN PLACE. PT TRACING AV PACED ON THE
CARDIAC MONITOR. ON RA SAT 99%. DENIES ANY SHORTNESS OF BREATH. NEURO CONSULT
IN PLACE. PT GOAL FOR TODAY IS REMAIN FREE FROM FALLS, MONITOR DIZZINESS,
NEURO CONSULT IN PLACE AND COMPLETE MRI. AM ASSESSMENT AS CHARTED. MEDICATIONS
PER MAR. PT REPOSITIONS SELF. HOURLY ROUNDING OBSERVED. BED IN LOW POSITION.
CALL LIGHT WITHIN REACH. WILL CONTINUE PLAN OF CARE.

## 2020-11-15 VITALS — SYSTOLIC BLOOD PRESSURE: 151 MMHG | DIASTOLIC BLOOD PRESSURE: 65 MMHG

## 2020-11-15 VITALS — DIASTOLIC BLOOD PRESSURE: 61 MMHG | SYSTOLIC BLOOD PRESSURE: 161 MMHG

## 2020-11-16 VITALS — SYSTOLIC BLOOD PRESSURE: 177 MMHG | DIASTOLIC BLOOD PRESSURE: 67 MMHG

## 2020-11-16 VITALS — SYSTOLIC BLOOD PRESSURE: 138 MMHG | DIASTOLIC BLOOD PRESSURE: 52 MMHG

## 2020-11-16 VITALS — DIASTOLIC BLOOD PRESSURE: 63 MMHG | SYSTOLIC BLOOD PRESSURE: 138 MMHG

## 2020-11-16 VITALS — DIASTOLIC BLOOD PRESSURE: 60 MMHG | SYSTOLIC BLOOD PRESSURE: 130 MMHG

## 2020-11-16 VITALS — SYSTOLIC BLOOD PRESSURE: 168 MMHG | DIASTOLIC BLOOD PRESSURE: 63 MMHG

## 2020-11-16 VITALS — DIASTOLIC BLOOD PRESSURE: 47 MMHG | SYSTOLIC BLOOD PRESSURE: 143 MMHG

## 2020-11-16 LAB
ANION GAP SERPL CALC-SCNC: 5 MMOL/L (ref 7–16)
BUN SERPL-MCNC: 19 MG/DL (ref 7–18)
CALCIUM SERPL-MCNC: 9 MG/DL (ref 8.5–10.1)
CHLORIDE SERPL-SCNC: 104 MMOL/L (ref 98–107)
CO2 SERPL-SCNC: 32 MMOL/L (ref 21–32)
CREAT SERPL-MCNC: 0.9 MG/DL (ref 0.6–1.3)
GLUCOSE SERPL-MCNC: 88 MG/DL (ref 70–99)
HCT VFR BLD CALC: 48.6 % (ref 37–47)
HGB BLD-MCNC: 16.1 GM/DL (ref 12–15)
MCH RBC QN AUTO: 29.8 PG (ref 26–34)
MCHC RBC AUTO-ENTMCNC: 33.2 G/DL (ref 28–37)
MCV RBC: 89.7 FL (ref 80–100)
MPV: 10.5 FL. (ref 7.2–11.1)
PLATELET COUNT*: 149 THOU/UL (ref 150–400)
POTASSIUM SERPL-SCNC: 3.7 MMOL/L (ref 3.5–5.1)
RBC # BLD AUTO: 5.42 MIL/UL (ref 4.2–5)
RDW-CV: 13.4 % (ref 10.5–14.5)
SODIUM SERPL-SCNC: 141 MMOL/L (ref 136–145)
WBC # BLD AUTO: 5.8 THOU/UL (ref 4–11)

## 2020-11-16 NOTE — NUR
CM INFORMED DURING PRIME ROUNDING OF THE PLAN OF CARE FOR THE PT INCLUDING
PLAN FOR PT TO HAVE MRI COMPLETED OUTPATIENT, AND PLAN TO POSSIBLY D/C PATIENT
TOMORROW. PT'S VIRTIGO IMPROVING. CM WILL REMAIN AVAILABLE TO ASSIST AND
FOLLOW AS NEEDED.

## 2020-11-16 NOTE — NUR
ASSUMED PT CARE AT 0730, PT AOX4, NO C/O PAIN OR SHORTNESS OF BREATH. PT GOT
INTO SHOWER TODAY W/ NO DIZZINESS REPORTED. PT TO HAVE MRI AS AN OUTPT R/T IT
NOT BEING APPROVED DURING INPT STAY. PT GOAL IS TO REMAIN FREE FROM DIZZINESS
AND FALLS. AM ASSESSMENT AS CHARTED, MEDS PER MAR, HOURLY ROUNDING OBSERVED,
FALL PRECAUTIONS IN PLACE, CALL LIGHT W/IN REACH.

## 2020-11-16 NOTE — NUR
PT CARE ASSUMED AT 1930. SAT MAINTAIINED IN RA. DIZZINESS WITH HEAD MOVEMENT.
DENIES PAIN AND SOB. CALL LIGHT WITHIN REACH AND BED IN LOW POSITION. HOURLY
ROUNDING DONE FOR PT SAFETY.

## 2020-11-17 VITALS — SYSTOLIC BLOOD PRESSURE: 153 MMHG | DIASTOLIC BLOOD PRESSURE: 54 MMHG

## 2020-11-17 VITALS — SYSTOLIC BLOOD PRESSURE: 129 MMHG | DIASTOLIC BLOOD PRESSURE: 57 MMHG

## 2020-11-17 VITALS — SYSTOLIC BLOOD PRESSURE: 143 MMHG | DIASTOLIC BLOOD PRESSURE: 57 MMHG

## 2020-11-17 NOTE — NUR
PATIENT DISCHARGED TO HOME
DC INSTRCTIONS PAPERWORK GIVEN AND ACKNOWLEDGED
IV AND HEART MONITOR REMOVED
PATIENT ASSISTED OUT VIA WC GOOD CONDITION TO [facility]

## 2020-11-17 NOTE — NUR
PT CARE ASSUMED AT 1930. SAT MAINTAINED IN RA. PT SAYING "FEELS A LOT BETTER".
DENIES PAIN AND SOB. CALL LIGHT WITHIN REACH AND BED IN LOW POSITION. HOURLY
ROUNDING DONE FOR PT SAFETY.

## 2020-11-19 NOTE — CON
[address]  [address]                    CONSULTATION                  
_______________________________________________________________________________
 
Name:       COGAN,JOAN S                   Room:           [address] [place]  
M.R.#:  [id number]      Account #:      [id number]  
Admission:  11/13/20     Attend Phys:    Jannie Cruz MD 
Discharge:  11/17/20     Date of Birth:  [birth year]  
         Report #: [id number]
                                                                     [id number]  
_______________________________________________________________________________
THIS REPORT FOR:  //name//                      
 
cc:  Subhash Crews John E. DO                                                  ~
DATE OF SERVICE:  11/12/2020
 
 
HISTORY OF PRESENT ILLNESS:  This is a 75-year-old female patient who was
evaluated by me for a neurological etiology for the patient's dizziness.  She
said it started yesterday, she had some nausea and vomiting.  She had some
ambulation difficulty.  She is feeling somewhat better.  Movement made it worse.
 She came here by ambulance.
 
REVIEW OF SYSTEMS:  Indicate that she had multiple problems.  Review of the
records indicate that she was diagnosed with atrial fibrillation and to consider
long-term anticoagulation in this patient.  She has been stressed out.  She is
working extra hours.  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  She is not on
any anticoagulation.  She had both knee replaced in the past.  She has a
pacemaker.  She said it was put in more than a year ago.  She does not know if
the pacemaker is MRI compatible or not.  She does have a history of
hypertension.  She does have a history of bypass surgery.  She does not complain
of any eye, cardiac, respiratory, GI, , musculoskeletal, constitutional,
dermatological, hematological, psychiatric, throat, allergic symptom associated
with present symptomatology.
 
PAST MEDICAL HISTORY:  Negative for this kind of dizziness.
 
FAMILY HISTORY:  Unremarkable.
 
SOCIAL HISTORY:  She does not drink any alcohol or smoke.
 
PHYSICAL EXAMINATION:  Indicate the patient is alert, responsive, able to follow
simple and complex command.  Her speech looks intact and fund of knowledge and
memory look intact.  Cranial nerve examination 2-12 looks unremarkable. 
Specifically, I did not see any nystagmus.  She has reasonably good strength,
sensation, reflexes and tone in all 4 extremities.  Reflexes are difficult to
tell because of knee problems.  There is no meningeal sign.  I could not look at
the patient's fundus.  She does finger-to-nose reasonably well on both sides. 
Cardiac examination showed a lot normal.  No respiratory difficulty or rhonchi
was noticed.  Pulses are somewhat difficult to feel.  She is a well-developed
individual.  She does not have any dysmorphic features of eyes, ears and face. 
Her vision and hearing looks adequate.
 
LABORATORY DATA:  White count is 6.4.  GFR is 98.  She did have a CT scan of the
head, which was reviewed and is unremarkable.  Blood pressure is 148/61,
respirations 16, pulse is 60, temperature is 97.0.
 
 
 
[place] [address]                    CONSULTATION                  
_______________________________________________________________________________
 
Name:       COGAN,JOAN S                   Room:           [address] [place]  
[facility]#:  [id number]      Account #:      [id number]  
Admission:  11/13/20     Attend Phys:    Jannie Cruz MD 
Discharge:  11/17/20     Date of Birth:  [birth year]  
         Report #: [id number]
                                                                     [id number]  
_______________________________________________________________________________
 
IMPRESSION:  The patient complained of dizziness, it is profound dizziness. 
Still more likely cause is going to be ENT.  However, basilar artery pathology
and posterior fossa CVA need to be excluded.
 
We do not know whether her pacemaker is compatible with MRI or not and even if
it is compatible, it will take some time to set it off.  I talked to her about
CT angio, which will exclude any basilar pathology.  She wants to proceed with
that.  She understands the potential side effect of it including allergic
reaction and kidney shut down.  If the basilar artery is normal, I think still
it will be desirable to do an MRI on her sometime after we find out if her
pacemaker is compatible with MRI or not.  Multiple other issues.  A cardiac
issue need to be decided in this patient.  Last consultation in 2019 indicated
they were considering long-term anticoagulation in this patient because of
atrial fibrillation.  That needs to be addressed.  Furthermore, it might be
desirable to do an MRI in this patient if it can be done to see if she has any
evidence for a recent or even passed stroke because that may make it a stronger
indication for the patient to be on anticoagulation, which she claims she is
not.  To address all those questions or predict cardiac consult.
 
Thank you very much for this referral and if you have any question, please feel
free to contact me.  Dr. Araujo will follow up this patient with you from
tomorrow.
 
Thank you very much for this referral.
 
 
 
 
 
 
 
 
 
 
 
 
 
 
 
 
 
 
 
<ELECTRONICALLY SIGNED>
                                        By:  Kamlesh Milian MD         
11/19/20     1346
D: 11/12/20 1531_______________________________________
T: 11/12/20 1606Kamlesh Milian MD            /nt

## 2021-08-26 ENCOUNTER — HOSPITAL ENCOUNTER (OUTPATIENT)
Dept: HOSPITAL 96 - M.RAD | Age: 76
End: 2021-08-26
Attending: FAMILY MEDICINE
Payer: MEDICARE

## 2021-08-26 DIAGNOSIS — Z12.31: Primary | ICD-10-CM

## 2021-08-26 DIAGNOSIS — Z78.0: ICD-10-CM

## 2021-08-26 DIAGNOSIS — M85.80: ICD-10-CM

## 2021-08-26 DIAGNOSIS — E03.9: ICD-10-CM
